# Patient Record
Sex: FEMALE | Race: WHITE | NOT HISPANIC OR LATINO | Employment: FULL TIME | URBAN - METROPOLITAN AREA
[De-identification: names, ages, dates, MRNs, and addresses within clinical notes are randomized per-mention and may not be internally consistent; named-entity substitution may affect disease eponyms.]

---

## 2021-01-29 LAB
EXTERNAL HEPATITIS B SURFACE ANTIGEN: NEGATIVE
EXTERNAL HEPATITIS C AB: NEGATIVE
EXTERNAL RUBELLA QUALITATIVE: NORMAL
EXTERNAL VDRL: NORMAL
HIV1 P24 AG SERPL QL IA: NEGATIVE

## 2021-06-01 ENCOUNTER — HOSPITAL ENCOUNTER (OUTPATIENT)
Dept: DIABETES SERVICES | Facility: HOSPITAL | Age: 31
Discharge: HOME | End: 2021-06-01
Attending: OBSTETRICS & GYNECOLOGY

## 2021-07-03 ENCOUNTER — HOSPITAL ENCOUNTER (OUTPATIENT)
Facility: HOSPITAL | Age: 31
Discharge: HOME | End: 2021-07-03
Attending: OBSTETRICS & GYNECOLOGY | Admitting: OBSTETRICS & GYNECOLOGY

## 2021-07-03 VITALS
HEIGHT: 59 IN | RESPIRATION RATE: 20 BRPM | TEMPERATURE: 98.5 F | DIASTOLIC BLOOD PRESSURE: 48 MMHG | WEIGHT: 215 LBS | BODY MASS INDEX: 43.34 KG/M2 | HEART RATE: 83 BPM | SYSTOLIC BLOOD PRESSURE: 111 MMHG | OXYGEN SATURATION: 99 %

## 2021-07-03 PROCEDURE — 59025 FETAL NON-STRESS TEST: CPT

## 2021-07-03 PROCEDURE — G0463 HOSPITAL OUTPT CLINIC VISIT: HCPCS

## 2021-07-03 RX ORDER — PRENATAL VIT NO.126/IRON/FOLIC 28MG-0.8MG
TABLET ORAL DAILY
COMMUNITY
End: 2022-04-05

## 2021-07-03 RX ORDER — MELATONIN
2000 DAILY
COMMUNITY
End: 2022-04-05

## 2021-07-03 NOTE — NON STRESS TEST
Obstetrical Non-stress Test Interpretation     Name:  Freda Curry  MRN: [id number]    30 y.o. female  at 33w4d    Indication: Abdominal pain  Weeks Gestation: 33w4d       Baseline: 140 BPM  Variability:   Moderate/Normal (amplitude 6-25 bpm)  Accelerations: Present (32 weeks+) 15 x 15 bpm  Decelerations: Absent   Contractions:  Absent      Impression:  Reactive NST    Plan: Discharge to home.  Keep follow up per office instructions.      Blaise Doty Sr., MD  7/3/2021  11:05 EDT

## 2021-07-03 NOTE — DISCHARGE INSTR - LAB
Follow up as scheduled. Return to Labor and Delivery for any concerns or new and worsening symptoms.

## 2021-07-29 ENCOUNTER — HOSPITAL ENCOUNTER (OUTPATIENT)
Facility: HOSPITAL | Age: 31
Discharge: HOME | End: 2021-07-29
Attending: OBSTETRICS & GYNECOLOGY | Admitting: OBSTETRICS & GYNECOLOGY

## 2021-07-29 VITALS — SYSTOLIC BLOOD PRESSURE: 91 MMHG | DIASTOLIC BLOOD PRESSURE: 44 MMHG | RESPIRATION RATE: 18 BRPM | HEART RATE: 72 BPM

## 2021-07-29 PROCEDURE — G0463 HOSPITAL OUTPT CLINIC VISIT: HCPCS

## 2021-07-29 PROCEDURE — 59025 FETAL NON-STRESS TEST: CPT

## 2021-08-05 ENCOUNTER — HOSPITAL ENCOUNTER (OUTPATIENT)
Facility: HOSPITAL | Age: 31
Discharge: HOME | End: 2021-08-05
Attending: OBSTETRICS & GYNECOLOGY | Admitting: ADVANCED PRACTICE MIDWIFE

## 2021-08-05 ENCOUNTER — HOSPITAL ENCOUNTER (OUTPATIENT)
Dept: LABOR AND DELIVERY | Facility: HOSPITAL | Age: 31
Setting detail: SURGERY ADMIT
Discharge: HOME | End: 2021-08-05

## 2021-08-05 VITALS — SYSTOLIC BLOOD PRESSURE: 117 MMHG | RESPIRATION RATE: 18 BRPM | HEART RATE: 75 BPM | DIASTOLIC BLOOD PRESSURE: 57 MMHG

## 2021-08-05 PROCEDURE — G0463 HOSPITAL OUTPT CLINIC VISIT: HCPCS

## 2021-08-05 PROCEDURE — 59025 FETAL NON-STRESS TEST: CPT

## 2021-08-05 NOTE — NON STRESS TEST
[facility]   Obstetrical Non-Stress Test Interpretation    Patient Name: Freda Curry  : 1990  MRN: [id number]  Primary Care Physician:  Daniel Ram MD  Date of admission: 2021      30 y.o. female  at 38w2d    Indication: Elevated BMI      Baseline: Normal 110-160 bpm  Variability:   Moderate/Normal (amplitude 6-25 bpm)  Accelerations: Present (32 weeks+) 15 x 15 bpm  Decelerations: Absent   Contractions:  Present       Impression:  Reactive      Dayan Griffiths CNM  2021  15:18 EDT

## 2021-08-05 NOTE — SIGNIFICANT NOTE
08/05/21 1142   Nonstress Test   Reason for NST Other (Comment)  (ELEVATED BMI)   Acoustic Stimulator No   Uterine Irritability No   Contractions Irregular  (OCCASIONAL CONTRACTION  PT UNAWARE)   Fetal Assessment   Fetal Movement active   Fetal HR Assessment Method external   Fetal HR (beats/min) 140  (FHR TRACING REVIEWED BY HANNAH HAY CNM AND DISCHARGE ORDERS GIVEN)   Fetal HR Variability moderate (amplitude range 6 to 25 bpm)   Fetal HR Accelerations lasting at least 15 seconds   Fetal HR Decelerations absent   Interpretation A   Nonstress Test Interpretation A Reactive

## 2021-08-10 ENCOUNTER — PREP FOR SURGERY (OUTPATIENT)
Dept: OTHER | Facility: HOSPITAL | Age: 31
End: 2021-08-10

## 2021-08-10 PROBLEM — Z98.891 H/O: C-SECTION: Status: ACTIVE | Noted: 2021-08-10

## 2021-08-10 RX ORDER — ONDANSETRON 4 MG/1
4 TABLET, FILM COATED ORAL EVERY 6 HOURS PRN
Status: CANCELLED | OUTPATIENT
Start: 2021-08-10

## 2021-08-10 RX ORDER — ONDANSETRON 2 MG/ML
4 INJECTION INTRAMUSCULAR; INTRAVENOUS EVERY 6 HOURS PRN
Status: CANCELLED | OUTPATIENT
Start: 2021-08-10

## 2021-08-10 RX ORDER — MISOPROSTOL 200 UG/1
800 TABLET ORAL AS NEEDED
Status: CANCELLED | OUTPATIENT
Start: 2021-08-10

## 2021-08-10 RX ORDER — TRISODIUM CITRATE DIHYDRATE AND CITRIC ACID MONOHYDRATE 500; 334 MG/5ML; MG/5ML
30 SOLUTION ORAL ONCE
Status: CANCELLED | OUTPATIENT
Start: 2021-08-10 | End: 2021-08-10

## 2021-08-10 RX ORDER — FAMOTIDINE 20 MG/1
20 TABLET, FILM COATED ORAL ONCE AS NEEDED
Status: CANCELLED | OUTPATIENT
Start: 2021-08-10

## 2021-08-10 RX ORDER — SODIUM CHLORIDE 0.9 % (FLUSH) 0.9 %
10 SYRINGE (ML) INJECTION AS NEEDED
Status: CANCELLED | OUTPATIENT
Start: 2021-08-10

## 2021-08-10 RX ORDER — LIDOCAINE HYDROCHLORIDE 10 MG/ML
5 INJECTION, SOLUTION EPIDURAL; INFILTRATION; INTRACAUDAL; PERINEURAL AS NEEDED
Status: CANCELLED | OUTPATIENT
Start: 2021-08-10

## 2021-08-10 RX ORDER — METOCLOPRAMIDE HYDROCHLORIDE 5 MG/ML
10 INJECTION INTRAMUSCULAR; INTRAVENOUS ONCE AS NEEDED
Status: CANCELLED | OUTPATIENT
Start: 2021-08-10

## 2021-08-10 RX ORDER — OXYTOCIN-SODIUM CHLORIDE 0.9% IV SOLN 30 UNIT/500ML 30-0.9/5 UT/ML-%
125 SOLUTION INTRAVENOUS ONCE
Status: CANCELLED | OUTPATIENT
Start: 2021-08-10 | End: 2021-08-10

## 2021-08-10 RX ORDER — SODIUM CHLORIDE 0.9 % (FLUSH) 0.9 %
3 SYRINGE (ML) INJECTION EVERY 12 HOURS SCHEDULED
Status: CANCELLED | OUTPATIENT
Start: 2021-08-10

## 2021-08-10 RX ORDER — METHYLERGONOVINE MALEATE 0.2 MG/ML
200 INJECTION INTRAVENOUS ONCE AS NEEDED
Status: CANCELLED | OUTPATIENT
Start: 2021-08-10

## 2021-08-10 RX ORDER — FAMOTIDINE 10 MG/ML
20 INJECTION, SOLUTION INTRAVENOUS ONCE AS NEEDED
Status: CANCELLED | OUTPATIENT
Start: 2021-08-10

## 2021-08-10 RX ORDER — CEFAZOLIN SODIUM IN 0.9 % NACL 3 G/100 ML
3 INTRAVENOUS SOLUTION, PIGGYBACK (ML) INTRAVENOUS ONCE
Status: CANCELLED | OUTPATIENT
Start: 2021-08-10 | End: 2021-08-10

## 2021-08-10 RX ORDER — SODIUM CHLORIDE, SODIUM LACTATE, POTASSIUM CHLORIDE, CALCIUM CHLORIDE 600; 310; 30; 20 MG/100ML; MG/100ML; MG/100ML; MG/100ML
150 INJECTION, SOLUTION INTRAVENOUS CONTINUOUS
Status: CANCELLED | OUTPATIENT
Start: 2021-08-10

## 2021-08-10 RX ORDER — TRANEXAMIC ACID 100 MG/ML
1000 INJECTION, SOLUTION INTRAVENOUS ONCE AS NEEDED
Status: CANCELLED | OUTPATIENT
Start: 2021-08-10

## 2021-08-10 NOTE — H&P (VIEW-ONLY)
OB HISTORY AND PHYSICAL      SUBJECTIVE:    30 y.o. female  currently at 39w1d PN complicated by:  Elevated BMI, Prior  and GDMA2    CC:  Presents with Scheduled CS    HPI:  Just recently started on metformin    ROS: No leaking fluid, No vaginal bleeding, No contractions and Adequate FM    Past OB History: G1 2016 Csection CPD 7 1/2lbs    Prenatal Labs:  Reviewed, see PNR, labs of note: Apos, RI, abn 3hr GTT, GBS neg    PMHx:    Past Medical History:   Diagnosis Date   • Asthma    • Gestational diabetes        Home Medications:  cholecalciferol, metFORMIN, and prenatal vitamin    Allergies:  Allergies   Allergen Reactions   • Penicillins Rash       PSHx:    Past Surgical History:   Procedure Laterality Date   •  SECTION  2016   • CHOLECYSTECTOMY     • PARATHYROID GLAND SURGERY  2012    Parathyroid tumor removal       Social History:    reports that she has never smoked. She has never used smokeless tobacco. She reports previous alcohol use. She reports that she does not use drugs.    Family History: Non contributory    Immunizations: See prenatal record for Tdap, Flu, Covid and/or other vaccinations    PHYSICAL EXAM:    Vitals: Reviewed       General- NAD, alert and oriented, appropriate  Psych- normal mood, good memory  CV- Regular rhythm, no murnurs  Resp- CTA to bases, no wheezes  Abdomen- Gravid, non tender  Fundus-  Non tender. Size: larger than dates, pannus  Presentation- Variable  Ext/DTR: No edema    Fetal HR: Doptones 150  Contractions: Not henry    Lab/Imaging/Other: see EPIC, reviewed if available      ASSESSMENT:  39w1d  Scheduled CS  BMI 44  GDMA2- metformin  GBS: Negative    PLAN:  Admit  Delivery:    Accucheck BS  Allergy to PCN is mild rash, Kefzol 3gm at CS  Plan of care [facility] hospital course, R/B/A/potential SE, length have been reviewed with patient, questions answered to her/his/their satisfaction.  Pt desires to proceed as above.  Counseling:The  patient was counseled on the risks, benefits and alternatives of a .  Risks reviewed, but are not limited to: anesthesia, bleeding, transfusion, infection, damage to organs, reoperation, wound separation, blood clots, and death.  She declines the alternatives and desires a .  All her questions have been answered to her satisfaction and she desires to proceed.        Electronically signed by Lori Sandoval DO, 08/10/21, 6:38 PM EDT.

## 2021-08-10 NOTE — H&P
OB HISTORY AND PHYSICAL      SUBJECTIVE:    30 y.o. female  currently at 39w1d PN complicated by:  Elevated BMI, Prior  and GDMA2    CC:  Presents with Scheduled CS    HPI:  Just recently started on metformin    ROS: No leaking fluid, No vaginal bleeding, No contractions and Adequate FM    Past OB History: G1 2016 Csection CPD 7 1/2lbs    Prenatal Labs:  Reviewed, see PNR, labs of note: Apos, RI, abn 3hr GTT, GBS neg    PMHx:    Past Medical History:   Diagnosis Date   • Asthma    • Gestational diabetes        Home Medications:  cholecalciferol, metFORMIN, and prenatal vitamin    Allergies:  Allergies   Allergen Reactions   • Penicillins Rash       PSHx:    Past Surgical History:   Procedure Laterality Date   •  SECTION  2016   • CHOLECYSTECTOMY     • PARATHYROID GLAND SURGERY  2012    Parathyroid tumor removal       Social History:    reports that she has never smoked. She has never used smokeless tobacco. She reports previous alcohol use. She reports that she does not use drugs.    Family History: Non contributory    Immunizations: See prenatal record for Tdap, Flu, Covid and/or other vaccinations    PHYSICAL EXAM:    Vitals: Reviewed       General- NAD, alert and oriented, appropriate  Psych- normal mood, good memory  CV- Regular rhythm, no murnurs  Resp- CTA to bases, no wheezes  Abdomen- Gravid, non tender  Fundus-  Non tender. Size: larger than dates, pannus  Presentation- Variable  Ext/DTR: No edema    Fetal HR: Doptones 150  Contractions: Not henry    Lab/Imaging/Other: see EPIC, reviewed if available      ASSESSMENT:  39w1d  Scheduled CS  BMI 44  GDMA2- metformin  GBS: Negative    PLAN:  Admit  Delivery:    Accucheck BS  Allergy to PCN is mild rash, Kefzol 3gm at CS  Plan of care [facility] hospital course, R/B/A/potential SE, length have been reviewed with patient, questions answered to her/his/their satisfaction.  Pt desires to proceed as above.  Counseling:The  patient was counseled on the risks, benefits and alternatives of a .  Risks reviewed, but are not limited to: anesthesia, bleeding, transfusion, infection, damage to organs, reoperation, wound separation, blood clots, and death.  She declines the alternatives and desires a .  All her questions have been answered to her satisfaction and she desires to proceed.        Electronically signed by Lori Sandoval DO, 08/10/21, 6:38 PM EDT.

## 2021-08-11 ENCOUNTER — ANESTHESIA (OUTPATIENT)
Dept: LABOR AND DELIVERY | Facility: HOSPITAL | Age: 31
End: 2021-08-11

## 2021-08-11 ENCOUNTER — HOSPITAL ENCOUNTER (INPATIENT)
Facility: HOSPITAL | Age: 31
LOS: 2 days | Discharge: HOME | End: 2021-08-13
Attending: OBSTETRICS & GYNECOLOGY | Admitting: OBSTETRICS & GYNECOLOGY

## 2021-08-11 ENCOUNTER — ANESTHESIA EVENT (OUTPATIENT)
Dept: LABOR AND DELIVERY | Facility: HOSPITAL | Age: 31
End: 2021-08-11

## 2021-08-11 LAB
ABO GROUP BLD: NORMAL
ABO GROUP BLD: NORMAL
BLD GP AB SCN SERPL QL: NEGATIVE
GLUCOSE BLDC GLUCOMTR-MCNC: 74 MG/DL (ref 70–99)
GLUCOSE BLDC GLUCOMTR-MCNC: 78 MG/DL (ref 70–130)
GLUCOSE BLDC GLUCOMTR-MCNC: 79 MG/DL (ref 70–99)
RH BLD: POSITIVE
RH BLD: POSITIVE
T&S EXPIRATION DATE: NORMAL

## 2021-08-11 PROCEDURE — 25010000002 DEXAMETHASONE PER 1 MG: Performed by: NURSE ANESTHETIST, CERTIFIED REGISTERED

## 2021-08-11 PROCEDURE — 25010000003 MORPHINE PER 10 MG: Performed by: ANESTHESIOLOGY

## 2021-08-11 PROCEDURE — 86901 BLOOD TYPING SEROLOGIC RH(D): CPT

## 2021-08-11 PROCEDURE — 25010000002 ONDANSETRON PER 1 MG: Performed by: OBSTETRICS & GYNECOLOGY

## 2021-08-11 PROCEDURE — 25010000002 KETOROLAC TROMETHAMINE PER 15 MG: Performed by: NURSE ANESTHETIST, CERTIFIED REGISTERED

## 2021-08-11 PROCEDURE — 86900 BLOOD TYPING SEROLOGIC ABO: CPT | Performed by: OBSTETRICS & GYNECOLOGY

## 2021-08-11 PROCEDURE — 25010000002 ONDANSETRON PER 1 MG: Performed by: NURSE ANESTHETIST, CERTIFIED REGISTERED

## 2021-08-11 PROCEDURE — 82962 GLUCOSE BLOOD TEST: CPT

## 2021-08-11 PROCEDURE — 86850 RBC ANTIBODY SCREEN: CPT | Performed by: OBSTETRICS & GYNECOLOGY

## 2021-08-11 PROCEDURE — 25010000002 KETOROLAC TROMETHAMINE PER 15 MG: Performed by: OBSTETRICS & GYNECOLOGY

## 2021-08-11 PROCEDURE — 86901 BLOOD TYPING SEROLOGIC RH(D): CPT | Performed by: OBSTETRICS & GYNECOLOGY

## 2021-08-11 PROCEDURE — 86900 BLOOD TYPING SEROLOGIC ABO: CPT

## 2021-08-11 RX ORDER — OXYCODONE HYDROCHLORIDE 5 MG/1
5 TABLET ORAL
Status: DISCONTINUED | OUTPATIENT
Start: 2021-08-11 | End: 2021-08-13 | Stop reason: HOSPADM

## 2021-08-11 RX ORDER — ONDANSETRON 2 MG/ML
4 INJECTION INTRAMUSCULAR; INTRAVENOUS EVERY 6 HOURS PRN
Status: DISCONTINUED | OUTPATIENT
Start: 2021-08-11 | End: 2021-08-13 | Stop reason: HOSPADM

## 2021-08-11 RX ORDER — ONDANSETRON 2 MG/ML
INJECTION INTRAMUSCULAR; INTRAVENOUS AS NEEDED
Status: DISCONTINUED | OUTPATIENT
Start: 2021-08-11 | End: 2021-08-11 | Stop reason: SURG

## 2021-08-11 RX ORDER — IBUPROFEN 800 MG/1
800 TABLET ORAL EVERY 8 HOURS SCHEDULED
Status: DISCONTINUED | OUTPATIENT
Start: 2021-08-12 | End: 2021-08-13 | Stop reason: HOSPADM

## 2021-08-11 RX ORDER — HYDROCODONE BITARTRATE AND ACETAMINOPHEN 5; 325 MG/1; MG/1
1 TABLET ORAL EVERY 4 HOURS PRN
Status: DISCONTINUED | OUTPATIENT
Start: 2021-08-12 | End: 2021-08-13 | Stop reason: HOSPADM

## 2021-08-11 RX ORDER — TRANEXAMIC ACID 100 MG/ML
1000 INJECTION, SOLUTION INTRAVENOUS ONCE AS NEEDED
Status: DISCONTINUED | OUTPATIENT
Start: 2021-08-11 | End: 2021-08-13 | Stop reason: HOSPADM

## 2021-08-11 RX ORDER — CARBOPROST TROMETHAMINE 250 UG/ML
250 INJECTION, SOLUTION INTRAMUSCULAR AS NEEDED
Status: DISCONTINUED | OUTPATIENT
Start: 2021-08-11 | End: 2021-08-13 | Stop reason: HOSPADM

## 2021-08-11 RX ORDER — KETOROLAC TROMETHAMINE 30 MG/ML
INJECTION, SOLUTION INTRAMUSCULAR; INTRAVENOUS AS NEEDED
Status: DISCONTINUED | OUTPATIENT
Start: 2021-08-11 | End: 2021-08-11 | Stop reason: SURG

## 2021-08-11 RX ORDER — EPHEDRINE SULFATE 50 MG/ML
INJECTION, SOLUTION INTRAVENOUS AS NEEDED
Status: DISCONTINUED | OUTPATIENT
Start: 2021-08-11 | End: 2021-08-11 | Stop reason: SURG

## 2021-08-11 RX ORDER — FAMOTIDINE 20 MG/1
20 TABLET, FILM COATED ORAL ONCE AS NEEDED
Status: DISCONTINUED | OUTPATIENT
Start: 2021-08-11 | End: 2021-08-13 | Stop reason: HOSPADM

## 2021-08-11 RX ORDER — HYDROCORTISONE 25 MG/G
CREAM TOPICAL 3 TIMES DAILY PRN
Status: DISCONTINUED | OUTPATIENT
Start: 2021-08-11 | End: 2021-08-13 | Stop reason: HOSPADM

## 2021-08-11 RX ORDER — ACETAMINOPHEN 325 MG/1
650 TABLET ORAL EVERY 6 HOURS
Status: DISCONTINUED | OUTPATIENT
Start: 2021-08-11 | End: 2021-08-13 | Stop reason: HOSPADM

## 2021-08-11 RX ORDER — SODIUM CHLORIDE 0.9 % (FLUSH) 0.9 %
3 SYRINGE (ML) INJECTION EVERY 12 HOURS SCHEDULED
Status: DISCONTINUED | OUTPATIENT
Start: 2021-08-11 | End: 2021-08-11 | Stop reason: HOSPADM

## 2021-08-11 RX ORDER — MISOPROSTOL 200 UG/1
800 TABLET ORAL AS NEEDED
Status: DISCONTINUED | OUTPATIENT
Start: 2021-08-11 | End: 2021-08-11 | Stop reason: HOSPADM

## 2021-08-11 RX ORDER — KETOROLAC TROMETHAMINE 30 MG/ML
30 INJECTION, SOLUTION INTRAMUSCULAR; INTRAVENOUS EVERY 6 HOURS
Status: DISCONTINUED | OUTPATIENT
Start: 2021-08-11 | End: 2021-08-11

## 2021-08-11 RX ORDER — SODIUM CHLORIDE, SODIUM LACTATE, POTASSIUM CHLORIDE, CALCIUM CHLORIDE 600; 310; 30; 20 MG/100ML; MG/100ML; MG/100ML; MG/100ML
150 INJECTION, SOLUTION INTRAVENOUS CONTINUOUS
Status: DISCONTINUED | OUTPATIENT
Start: 2021-08-11 | End: 2021-08-11

## 2021-08-11 RX ORDER — DIPHENHYDRAMINE HYDROCHLORIDE 50 MG/ML
12.5 INJECTION INTRAMUSCULAR; INTRAVENOUS EVERY 6 HOURS PRN
Status: ACTIVE | OUTPATIENT
Start: 2021-08-11 | End: 2021-08-12

## 2021-08-11 RX ORDER — PHENYLEPHRINE HCL IN 0.9% NACL 1 MG/10 ML
SYRINGE (ML) INTRAVENOUS AS NEEDED
Status: DISCONTINUED | OUTPATIENT
Start: 2021-08-11 | End: 2021-08-11 | Stop reason: SURG

## 2021-08-11 RX ORDER — KETOROLAC TROMETHAMINE 30 MG/ML
30 INJECTION, SOLUTION INTRAMUSCULAR; INTRAVENOUS EVERY 6 HOURS
Status: DISPENSED | OUTPATIENT
Start: 2021-08-11 | End: 2021-08-12

## 2021-08-11 RX ORDER — BUTORPHANOL TARTRATE 1 MG/ML
2 INJECTION, SOLUTION INTRAMUSCULAR; INTRAVENOUS EVERY 6 HOURS PRN
Status: ACTIVE | OUTPATIENT
Start: 2021-08-11 | End: 2021-08-12

## 2021-08-11 RX ORDER — OXYTOCIN-SODIUM CHLORIDE 0.9% IV SOLN 30 UNIT/500ML 30-0.9/5 UT/ML-%
125 SOLUTION INTRAVENOUS ONCE
Status: DISCONTINUED | OUTPATIENT
Start: 2021-08-11 | End: 2021-08-11

## 2021-08-11 RX ORDER — METOCLOPRAMIDE HYDROCHLORIDE 5 MG/ML
10 INJECTION INTRAMUSCULAR; INTRAVENOUS ONCE AS NEEDED
Status: DISCONTINUED | OUTPATIENT
Start: 2021-08-11 | End: 2021-08-11 | Stop reason: HOSPADM

## 2021-08-11 RX ORDER — MORPHINE SULFATE 0.5 MG/ML
INJECTION, SOLUTION EPIDURAL; INTRATHECAL; INTRAVENOUS
Status: COMPLETED | OUTPATIENT
Start: 2021-08-11 | End: 2021-08-11

## 2021-08-11 RX ORDER — SIMETHICONE 80 MG
80 TABLET,CHEWABLE ORAL 4 TIMES DAILY PRN
Status: DISCONTINUED | OUTPATIENT
Start: 2021-08-11 | End: 2021-08-13 | Stop reason: HOSPADM

## 2021-08-11 RX ORDER — ONDANSETRON 4 MG/1
4 TABLET, FILM COATED ORAL EVERY 6 HOURS PRN
Status: DISCONTINUED | OUTPATIENT
Start: 2021-08-11 | End: 2021-08-13 | Stop reason: HOSPADM

## 2021-08-11 RX ORDER — HYDROMORPHONE HCL 110MG/55ML
0.25 PATIENT CONTROLLED ANALGESIA SYRINGE INTRAVENOUS
Status: DISCONTINUED | OUTPATIENT
Start: 2021-08-11 | End: 2021-08-13 | Stop reason: HOSPADM

## 2021-08-11 RX ORDER — SODIUM CHLORIDE, SODIUM LACTATE, POTASSIUM CHLORIDE, CALCIUM CHLORIDE 600; 310; 30; 20 MG/100ML; MG/100ML; MG/100ML; MG/100ML
150 INJECTION, SOLUTION INTRAVENOUS CONTINUOUS
Status: DISCONTINUED | OUTPATIENT
Start: 2021-08-11 | End: 2021-08-13 | Stop reason: HOSPADM

## 2021-08-11 RX ORDER — METHYLERGONOVINE MALEATE 0.2 MG/ML
200 INJECTION INTRAVENOUS ONCE AS NEEDED
Status: DISCONTINUED | OUTPATIENT
Start: 2021-08-11 | End: 2021-08-11 | Stop reason: HOSPADM

## 2021-08-11 RX ORDER — HYDROMORPHONE HCL 110MG/55ML
0.5 PATIENT CONTROLLED ANALGESIA SYRINGE INTRAVENOUS
Status: DISCONTINUED | OUTPATIENT
Start: 2021-08-11 | End: 2021-08-13 | Stop reason: HOSPADM

## 2021-08-11 RX ORDER — SODIUM CHLORIDE 0.9 % (FLUSH) 0.9 %
10 SYRINGE (ML) INJECTION AS NEEDED
Status: DISCONTINUED | OUTPATIENT
Start: 2021-08-11 | End: 2021-08-11 | Stop reason: HOSPADM

## 2021-08-11 RX ORDER — OXYTOCIN 10 [USP'U]/ML
INJECTION, SOLUTION INTRAMUSCULAR; INTRAVENOUS AS NEEDED
Status: DISCONTINUED | OUTPATIENT
Start: 2021-08-11 | End: 2021-08-11 | Stop reason: SURG

## 2021-08-11 RX ORDER — NALOXONE HCL 0.4 MG/ML
0.4 VIAL (ML) INJECTION ONCE AS NEEDED
Status: ACTIVE | OUTPATIENT
Start: 2021-08-11 | End: 2021-08-12

## 2021-08-11 RX ORDER — DOCUSATE SODIUM 100 MG/1
100 CAPSULE, LIQUID FILLED ORAL DAILY
Status: DISCONTINUED | OUTPATIENT
Start: 2021-08-11 | End: 2021-08-13 | Stop reason: HOSPADM

## 2021-08-11 RX ORDER — DIPHENHYDRAMINE HCL 25 MG
25 CAPSULE ORAL EVERY 6 HOURS PRN
Status: ACTIVE | OUTPATIENT
Start: 2021-08-11 | End: 2021-08-12

## 2021-08-11 RX ORDER — METHYLERGONOVINE MALEATE 0.2 MG/ML
200 INJECTION INTRAVENOUS ONCE AS NEEDED
Status: DISCONTINUED | OUTPATIENT
Start: 2021-08-11 | End: 2021-08-13 | Stop reason: HOSPADM

## 2021-08-11 RX ORDER — CEFAZOLIN SODIUM IN 0.9 % NACL 3 G/100 ML
3 INTRAVENOUS SOLUTION, PIGGYBACK (ML) INTRAVENOUS ONCE
Status: COMPLETED | OUTPATIENT
Start: 2021-08-11 | End: 2021-08-11

## 2021-08-11 RX ORDER — HYDROCODONE BITARTRATE AND ACETAMINOPHEN 10; 325 MG/1; MG/1
1 TABLET ORAL EVERY 4 HOURS PRN
Status: DISCONTINUED | OUTPATIENT
Start: 2021-08-12 | End: 2021-08-13 | Stop reason: HOSPADM

## 2021-08-11 RX ORDER — BUPIVACAINE HYDROCHLORIDE 7.5 MG/ML
INJECTION, SOLUTION EPIDURAL; RETROBULBAR
Status: COMPLETED | OUTPATIENT
Start: 2021-08-11 | End: 2021-08-11

## 2021-08-11 RX ORDER — MEPERIDINE HYDROCHLORIDE 25 MG/ML
12.5 INJECTION INTRAMUSCULAR; INTRAVENOUS; SUBCUTANEOUS
Status: ACTIVE | OUTPATIENT
Start: 2021-08-11 | End: 2021-08-12

## 2021-08-11 RX ORDER — LIDOCAINE HYDROCHLORIDE 10 MG/ML
5 INJECTION, SOLUTION EPIDURAL; INFILTRATION; INTRACAUDAL; PERINEURAL AS NEEDED
Status: DISCONTINUED | OUTPATIENT
Start: 2021-08-11 | End: 2021-08-11 | Stop reason: HOSPADM

## 2021-08-11 RX ORDER — HYDROCODONE BITARTRATE AND ACETAMINOPHEN 5; 325 MG/1; MG/1
1 TABLET ORAL EVERY 6 HOURS PRN
Status: ACTIVE | OUTPATIENT
Start: 2021-08-11 | End: 2021-08-12

## 2021-08-11 RX ORDER — MISOPROSTOL 200 UG/1
200 TABLET ORAL AS NEEDED
Status: DISCONTINUED | OUTPATIENT
Start: 2021-08-11 | End: 2021-08-13 | Stop reason: HOSPADM

## 2021-08-11 RX ORDER — TRISODIUM CITRATE DIHYDRATE AND CITRIC ACID MONOHYDRATE 500; 334 MG/5ML; MG/5ML
30 SOLUTION ORAL ONCE
Status: DISCONTINUED | OUTPATIENT
Start: 2021-08-11 | End: 2021-08-11 | Stop reason: HOSPADM

## 2021-08-11 RX ORDER — FAMOTIDINE 10 MG/ML
20 INJECTION, SOLUTION INTRAVENOUS ONCE AS NEEDED
Status: DISCONTINUED | OUTPATIENT
Start: 2021-08-11 | End: 2021-08-13 | Stop reason: HOSPADM

## 2021-08-11 RX ORDER — DEXAMETHASONE SODIUM PHOSPHATE 4 MG/ML
INJECTION, SOLUTION INTRA-ARTICULAR; INTRALESIONAL; INTRAMUSCULAR; INTRAVENOUS; SOFT TISSUE AS NEEDED
Status: DISCONTINUED | OUTPATIENT
Start: 2021-08-11 | End: 2021-08-11 | Stop reason: SURG

## 2021-08-11 RX ORDER — FAMOTIDINE 10 MG/ML
20 INJECTION, SOLUTION INTRAVENOUS ONCE AS NEEDED
Status: DISCONTINUED | OUTPATIENT
Start: 2021-08-11 | End: 2021-08-11 | Stop reason: HOSPADM

## 2021-08-11 RX ADMIN — SODIUM CHLORIDE, POTASSIUM CHLORIDE, SODIUM LACTATE AND CALCIUM CHLORIDE: 600; 310; 30; 20 INJECTION, SOLUTION INTRAVENOUS at 13:56

## 2021-08-11 RX ADMIN — SODIUM CHLORIDE, POTASSIUM CHLORIDE, SODIUM LACTATE AND CALCIUM CHLORIDE 150 ML/HR: 600; 310; 30; 20 INJECTION, SOLUTION INTRAVENOUS at 11:17

## 2021-08-11 RX ADMIN — ONDANSETRON 4 MG: 2 INJECTION INTRAMUSCULAR; INTRAVENOUS at 14:21

## 2021-08-11 RX ADMIN — KETOROLAC TROMETHAMINE 30 MG: 30 INJECTION, SOLUTION INTRAMUSCULAR; INTRAVENOUS at 20:02

## 2021-08-11 RX ADMIN — Medication 100 MCG: at 13:40

## 2021-08-11 RX ADMIN — MORPHINE SULFATE 0.25 MG: 0.5 INJECTION, SOLUTION EPIDURAL; INTRATHECAL; INTRAVENOUS at 13:24

## 2021-08-11 RX ADMIN — CEFAZOLIN 3 G: 1 INJECTION, POWDER, FOR SOLUTION INTRAMUSCULAR; INTRAVENOUS; PARENTERAL at 13:16

## 2021-08-11 RX ADMIN — Medication 100 MCG: at 13:31

## 2021-08-11 RX ADMIN — BUPIVACAINE HYDROCHLORIDE 1.4 ML: 7.5 INJECTION, SOLUTION EPIDURAL; RETROBULBAR at 13:24

## 2021-08-11 RX ADMIN — Medication 100 MCG: at 13:28

## 2021-08-11 RX ADMIN — EPHEDRINE SULFATE 5 MG: 50 INJECTION INTRAVENOUS at 13:28

## 2021-08-11 RX ADMIN — EPHEDRINE SULFATE 5 MG: 50 INJECTION INTRAVENOUS at 13:34

## 2021-08-11 RX ADMIN — KETOROLAC TROMETHAMINE 30 MG: 30 INJECTION, SOLUTION INTRAMUSCULAR; INTRAVENOUS at 14:21

## 2021-08-11 RX ADMIN — ONDANSETRON 4 MG: 2 INJECTION INTRAMUSCULAR; INTRAVENOUS at 20:02

## 2021-08-11 RX ADMIN — DOCUSATE SODIUM 100 MG: 100 CAPSULE, LIQUID FILLED ORAL at 20:02

## 2021-08-11 RX ADMIN — OXYTOCIN 40 UNITS: 10 INJECTION, SOLUTION INTRAMUSCULAR; INTRAVENOUS at 13:56

## 2021-08-11 RX ADMIN — EPHEDRINE SULFATE 5 MG: 50 INJECTION INTRAVENOUS at 13:31

## 2021-08-11 RX ADMIN — ACETAMINOPHEN 650 MG: 325 TABLET ORAL at 20:02

## 2021-08-11 RX ADMIN — Medication 100 MCG: at 13:34

## 2021-08-11 RX ADMIN — DEXAMETHASONE SODIUM PHOSPHATE 4 MG: 4 INJECTION INTRA-ARTICULAR; INTRALESIONAL; INTRAMUSCULAR; INTRAVENOUS; SOFT TISSUE at 14:21

## 2021-08-11 RX ADMIN — Medication 100 MCG: at 13:53

## 2021-08-11 NOTE — LACTATION NOTE
LC in to assist with this first feeding. Mom states first baby never latched so she exclusively pumped for 11 months. This infant was showing good eagerness to eat and with latch assistance from LC was able to maintain latch on both sides but did take several attempts. Mom has short/flatter nipples and a thick areola. LC discussed that at some point that infant may become drowsy and less likely to latch without more assistance. Mom and dad showed good understanding.LC discussed with mom about  normal  breastfeeding behaviors and breastfeeding expectations for the next 2 days and to call as needed for lactation assistance . Mom showed good understanding.

## 2021-08-11 NOTE — ANESTHESIA PROCEDURE NOTES
Spinal Block      Start Time: 8/11/2021 1:18 PM  Stop Time: 8/11/2021 1:21 PM  Performed By  CRNA: Darlin Locke CRNA  Spinal Block Prep:  Patient Position:sitting  Sterile Tech:cap, gloves, mask and sterile barriers  Prep:Chloraprep  Spinal Block Procedure  Approach:midline  Location:L3-L4  Needle Type:Pencan  Needle Gauge:24 G  Paresthesia: no  Fluid Appearance:clear  Medications: morphine PF (DURAMORPH) injection, 0.25 mg  bupivacaine PF (MARCAINE) injection 0.75%, 1.4 mL   Post Assessment  Patient Tolerance:patient tolerated the procedure well with no apparent complications  Complications no

## 2021-08-11 NOTE — ANESTHESIA PREPROCEDURE EVALUATION
Anesthesia Evaluation     Patient summary reviewed and Nursing notes reviewed   NPO Solid Status: > 6 hours  NPO Liquid Status: > 6 hours           Airway   Mallampati: II  TM distance: >3 FB  Dental      Pulmonary - normal exam   (+) asthma,  Cardiovascular - normal exam  Exercise tolerance: good (4-7 METS)        Neuro/Psych  GI/Hepatic/Renal/Endo    (+)   diabetes mellitus,     Musculoskeletal     Abdominal    Substance History      OB/GYN          Other                        Anesthesia Plan    ASA 2     regional       Anesthetic plan, all risks, benefits, and alternatives have been provided, discussed and informed consent has been obtained with: patient.

## 2021-08-11 NOTE — DISCHARGE INSTRUCTIONS
DR. CODY'S POSTOP  DISCHARGE PRECAUTIONS and Answers to FAQs     NO SEX, tampons, or douching for [SIX] weeks.     NO DRIVING for [TWO] weeks. or while taking narcotic pain medications.     NO TUB BATH or POOL for FOUR weeks, shower only.       NO LIFTING more than [20] pounds for [2] week(s).     STAPLES (if present):  follow up at the office in the next 3-7 days to have them removed if they were not removed before discharge.  If your staples were removed already and steri-strips placed (or you just had steri-strips) REMOVE YOUR STERI STRIPS in TEN DAYS.      INCISION CARE:   WASH DAILY in the shower with soap and water (any type of soap is fine, it does not need to be antibacterial soap).  Look (or have a family member/friend look) at your incision EVERY DAY when you get home.  Keeping the incision DRY is extremely important.  Continue to keep a clean, dry wash cloth (to help wick away moisture) on your incision for 10 days.  Change out the wash cloth frequently (approximately every 2-8 hrs as needed to prevent it from getting moist).  REDNESS, PUS, increase in PAIN, FEVER or CHILLS are all reasons to be seen in our office immediately.  Go to the ER, if it is after hours or a weekend.         VAGINAL BLEEDING:  may continue on and off over the next several weeks after delivery and may increase slightly once you go home.  You should not be bleeding more than 1 large pad soaked every hour or two.  Clots (even the size of a lemon or larger) may be normal as long as the bleeding is not heavy and the clots do not continue.       FEVER or CHILLS or NOT FEELING WELL: call our office.  If the office is closed, you need to be seen in acute care or ER.       CHEST PAIN or SHORTNESS OF BREATH/AIR: you need to GO TO THE NEAREST ER or CALL 911.      SWELLING:  can increase over the next 7-10 days and then should slowly improve.  Your legs/ankles should be fairly similar in size.  A red, painful, hot, swollen leg  (usually just one side) can be a sign of a blood clot and should be evaluated immediately.  Call our office.  If it is after hours or a weekend, you must be seen IMMEDIATELY IN THE ER.      ELEVATED BLOOD PRESSURE:  you need to contact us if you are having  persistent elevated BP systolic (top number) more than [155] or diastolic (bottom number) more than [95], or a headache (not relieved with rest, hydration or over the counter pain reliever), an increase in your swelling (usually hands and face), changes in your vision (typically flashing white or black spots) or severe persistent pain in the location of the upper right side of your belly (under your right breast).  Call our office or go to ER if after hours or a weekend.     LACTATION QUESTIONS or CONCERNS?  Call [facility] Lactation support [phone].     WORK and SCHOOL TIME OFF: depends on your specific delivery type, surrounding circumstances, and your work insurance/school rules.  If you have questions, please call Wen or Vivi at [phone] (ext. [phone]).  Or email Wen at [email or web address].  They will assist in required paperwork for you and/or family members.      For further QUESTIONS or CONCERNS, please call [facility] at [phone].       [PRESS CHECK TO ACCEPT DEFAULTS]

## 2021-08-11 NOTE — OP NOTE
OB Operative Note        Date of Service:  21     Pre-Operative Dx:   IUP at Gestational Age: 39w1d  weeks    indication: scheduled repeat    Postoperative dx:   MICHELLE    Procedure: Repeat LTCS    Surgeon/Assistant: Dr. Lori Sandoval DO.  First assist Dayan Maxwell.    Anesthesia:  Spinal  Anesthesiologist:   Anesthesiologist: William Bowman MD  CRNA: Darlin Locke CRNA    EBL:  400Mls    Findings:   Normal uterus, Normal tubes, Normal ovaries and Normal placenta, centrally inserting cord    Infant: Gender:  female  infant   Weight:   3020 g (6 lb 10.5 oz)    APGARS:  8  @ 1 minute / 9  @ 5 minutes    Specimens:  Cord blood, cord gases    Procedure Details:  The patient was brought to the operating room and spinal anesthesia was deemed to be adequate.  She was prepped and draped in a normal sterile fashion and placed in supine position with a leftward tilt.  A Traxi panniculus retractor was used.  A Pfannenstiel skin incision was made approximately 2 fingerbreadths above the symphysis pubis and carried down to the underlying layer of fascia.   The fascia was nicked in the midline and extended laterally with Monzon scissors.  The superior and inferior aspects of the fascial incision were grasped with Kocher clamps and the underlying rectus muscle was dissected off bluntly.  The midline was identified and opened in a sharp fashion with no noted damage to underlying bowel, bladder, blood vessels, or organs.  An ivan self retaining retractor was placed.  The vesicouterine peritoneum was incised and the bladder flap was created.  The lower uterine segment was incised in a transverse fashion and extended laterally with bandage scissors.  Fluid was noted to be clear.  The fetal vertex was brought up atraumatically through the uterine incision.  The mouth and nares were bulb suctioned.  The right anterior and left posterior shoulders were delivered easily.  The remainder of the body delivered.  The  infant was vigorous.  The cord was clamped and cut after a delay of 60 seconds and the infant was handed off to [facility] baby care.  A segment of cord was handed off to the technician for collection of cord blood and cord gases.  The placenta delivered with the assistance of fundal massage and was discarded.  The uterus was exteriorized and cleared of all clots and debris.  The uterine incision was repaired with 0 Vicryl in a running fashion.  A second imbricating stitch was placed.  Right sided Terry stitches were placed.  Excellent hemostasis was assured.       The uterus was placed back into the pelvic cavity.  Copious irrigation was performed.  The gutters were cleared of all clot and debris.  The uterine incision was reinspected off tension and noted to be hemostatic.  Interceed was placed over the uterine incision and anterior uterus.  The parietal peritoneum was closed.  The rectus muscles were reapproximated in the midline.  The rectus muscles and fascia were noted to be hemostatic.  The fascia was closed with 0 Vicryl in a running fashion starting at the bilateral angles and to the midline.  The subcutaneous tissue was copiously irrigated and made hemostatic.  It was reapproximated using Monocryl and the skin was closed with staples.  Urine was clear at the end of the procedure.     The patient tolerated the procedure well.  Instrument, lap, and needle counts were correct.  The patient received antibiotics prior to the procedure.  She was taken to the recovery room in stable condition.        Complications:  None    Condition: Good    Disposition:  PACU          Electronically signed by:  Lori Sandoval DO, 08/11/21, 2:41 PM EDT.

## 2021-08-11 NOTE — DISCHARGE SUMMARY
OB Discharge Summary        Admit Date:  2021  Date of Delivery: 2021   Discharge Date: 21    Reason for Admission:  Scheduled RCS    Final Diagnosis:  39+1  scheduled RCS  BMI 44  GDMA1  Breast  To do at FU: 2hr GTT, wt loss    Antepartum:  Prenatal care is complicated by:  Elevated BMI, Prior  and GDMA1    Intrapartum/Delivery:  OB Surgeon:  Lori Sandoval DO  Anesthesia: Spinal  Delivery Type:   Perineum: NA  Feeding method: Breast    Infant: female  infant; Ning    Weight: 3020 g (6 lb 10.5 oz)      APGARS: 8  @ 1 minute / 9  @ 5 minutes    Hospital Course/Significant Findings:  Uncomplicated RCS and postop    Discharge:         Discharge Medications      New Medications      Instructions Start Date   acetaminophen 325 MG tablet  Commonly known as: Tylenol   650 mg, Oral, Every 6 Hours PRN      ibuprofen 800 MG tablet  Commonly known as: ADVIL,MOTRIN   800 mg, Oral, Every 8 Hours PRN         Continue These Medications      Instructions Start Date   cholecalciferol 25 MCG (1000 UT) tablet  Commonly known as: VITAMIN D3   2,000 Units, Oral, Daily      prenatal (CLASSIC) vitamin  tablet  Generic drug: prenatal vitamin   Oral, Daily               Disposition: Home  Diet: Regular    Pelvic Rest: 6 weeks    Condition at discharge: Good    Follow up with: Lori Sandoval DO or provider of her choice    Follow up in: 5 weeks    Complications: None      Signature: Electronically signed by Lori Sandoval DO, 21, 9:00 AM EDT.

## 2021-08-12 LAB
BASOPHILS # BLD AUTO: 0.05 10*3/MM3 (ref 0–0.2)
BASOPHILS NFR BLD AUTO: 0.4 % (ref 0–1.5)
DEPRECATED RDW RBC AUTO: 43.2 FL (ref 37–54)
EOSINOPHIL # BLD AUTO: 0.06 10*3/MM3 (ref 0–0.4)
EOSINOPHIL NFR BLD AUTO: 0.5 % (ref 0.3–6.2)
ERYTHROCYTE [DISTWIDTH] IN BLOOD BY AUTOMATED COUNT: 12.8 % (ref 12.3–15.4)
HCT VFR BLD AUTO: 34.4 % (ref 34–46.6)
HGB BLD-MCNC: 11.6 G/DL (ref 12–15.9)
IMM GRANULOCYTES # BLD AUTO: 0.06 10*3/MM3 (ref 0–0.05)
IMM GRANULOCYTES NFR BLD AUTO: 0.5 % (ref 0–0.5)
LYMPHOCYTES # BLD AUTO: 2.76 10*3/MM3 (ref 0.7–3.1)
LYMPHOCYTES NFR BLD AUTO: 22.1 % (ref 19.6–45.3)
MCH RBC QN AUTO: 31.4 PG (ref 26.6–33)
MCHC RBC AUTO-ENTMCNC: 33.7 G/DL (ref 31.5–35.7)
MCV RBC AUTO: 93 FL (ref 79–97)
MONOCYTES # BLD AUTO: 0.93 10*3/MM3 (ref 0.1–0.9)
MONOCYTES NFR BLD AUTO: 7.4 % (ref 5–12)
NEUTROPHILS NFR BLD AUTO: 69.1 % (ref 42.7–76)
NEUTROPHILS NFR BLD AUTO: 8.64 10*3/MM3 (ref 1.7–7)
NRBC BLD AUTO-RTO: 0 /100 WBC (ref 0–0.2)
PLATELET # BLD AUTO: 147 10*3/MM3 (ref 140–450)
PMV BLD AUTO: 11.2 FL (ref 6–12)
RBC # BLD AUTO: 3.7 10*6/MM3 (ref 3.77–5.28)
WBC # BLD AUTO: 12.5 10*3/MM3 (ref 3.4–10.8)

## 2021-08-12 PROCEDURE — 51702 INSERT TEMP BLADDER CATH: CPT

## 2021-08-12 PROCEDURE — 59025 FETAL NON-STRESS TEST: CPT

## 2021-08-12 PROCEDURE — 85025 COMPLETE CBC W/AUTO DIFF WBC: CPT | Performed by: OBSTETRICS & GYNECOLOGY

## 2021-08-12 PROCEDURE — 25010000002 KETOROLAC TROMETHAMINE PER 15 MG: Performed by: OBSTETRICS & GYNECOLOGY

## 2021-08-12 RX ADMIN — ACETAMINOPHEN 650 MG: 325 TABLET ORAL at 23:50

## 2021-08-12 RX ADMIN — KETOROLAC TROMETHAMINE 30 MG: 30 INJECTION, SOLUTION INTRAMUSCULAR; INTRAVENOUS at 01:45

## 2021-08-12 RX ADMIN — SODIUM CHLORIDE, POTASSIUM CHLORIDE, SODIUM LACTATE AND CALCIUM CHLORIDE 150 ML/HR: 600; 310; 30; 20 INJECTION, SOLUTION INTRAVENOUS at 00:43

## 2021-08-12 RX ADMIN — ACETAMINOPHEN 650 MG: 325 TABLET ORAL at 01:45

## 2021-08-12 RX ADMIN — ACETAMINOPHEN 650 MG: 325 TABLET ORAL at 06:15

## 2021-08-12 RX ADMIN — IBUPROFEN 800 MG: 800 TABLET, FILM COATED ORAL at 23:47

## 2021-08-12 RX ADMIN — IBUPROFEN 800 MG: 800 TABLET, FILM COATED ORAL at 16:44

## 2021-08-12 RX ADMIN — ACETAMINOPHEN 650 MG: 325 TABLET ORAL at 13:28

## 2021-08-12 RX ADMIN — DOCUSATE SODIUM 100 MG: 100 CAPSULE, LIQUID FILLED ORAL at 09:08

## 2021-08-12 RX ADMIN — KETOROLAC TROMETHAMINE 30 MG: 30 INJECTION, SOLUTION INTRAMUSCULAR; INTRAVENOUS at 07:56

## 2021-08-12 RX ADMIN — ACETAMINOPHEN 650 MG: 325 TABLET ORAL at 18:46

## 2021-08-12 NOTE — PLAN OF CARE
Goal Outcome Evaluation:  Plan of Care Reviewed With: patient      Performing self care and infant's care. Breastfeeding well. Vital signs stable

## 2021-08-12 NOTE — PROGRESS NOTES
PostPartum/PostOp PROGRESS NOTE        Subjective:  Patient has no complaints, Pain controlled, Tolerating a regular diet, Passing flatus, Ambulating, Urinating spontaneously, Lochia decreasing, no bleeding concerns, No lightheadedness or dizziness      Objective:     Vitals: reviewed  General- NAD, alert and oriented, appropriate  Psych- Normal mood, good memory  CV/Resp- CV- Regular rhythm, no murnurs and Resp- CTA to bases, no wheezes  Abdomen- Fundus non tender, Soft, non distended, non tender, normal active bowel sounds and Bandage intact  Ext/DTRs- Trace edema, bilaterally equal, no signs of DVT    Lab Results   Component Value Date    WBC 12.50 (H) 08/12/2021    HGB 11.6 (L) 08/12/2021    HCT 34.4 08/12/2021    MCV 93.0 08/12/2021     08/12/2021        Assessment:    Post-partum/postop Day:  1    Plan:     Routine postpartum/postop care    Advance diet, Remove IV, Remove bandage, Shower, PO pain meds, Importance of wound care/keep clean and dry, Breast feeding support, Follow up scheduled             Electronically signed by Lori Sandoval DO, 08/12/21, 9:38 AM EDT.

## 2021-08-12 NOTE — LACTATION NOTE
Lc in to follow up with breastfeeding progress. Mom states that baby became much sleepier after her first feeding and began to need the nipple shield. She is using the 24 mm and it is not causing any pain and her nipples show no trauma. LC encouraged her to call for assistance with a feeding as needed. Mom showed good understanding.

## 2021-08-12 NOTE — PLAN OF CARE
Problem: Adult Inpatient Plan of Care  Goal: Plan of Care Review  Outcome: Ongoing, Progressing  Flowsheets (Taken 8/12/2021 0456)  Progress: improving  Plan of Care Reviewed With: patient  Outcome Summary: Patient progressing towards goals.  Goal: Patient-Specific Goal (Individualized)  Outcome: Ongoing, Progressing  Goal: Absence of Hospital-Acquired Illness or Injury  Outcome: Ongoing, Progressing  Intervention: Identify and Manage Fall Risk  Recent Flowsheet Documentation  Taken 8/12/2021 0400 by Emilia Bearden RN  Safety Promotion/Fall Prevention: safety round/check completed  Taken 8/12/2021 0300 by Emilia Bearden RN  Safety Promotion/Fall Prevention: safety round/check completed  Taken 8/12/2021 0200 by Emilia Bearden RN  Safety Promotion/Fall Prevention: safety round/check completed  Taken 8/12/2021 0130 by Emilia Bearden RN  Safety Promotion/Fall Prevention: safety round/check completed  Taken 8/12/2021 0000 by Emilia Bearden RN  Safety Promotion/Fall Prevention: safety round/check completed  Taken 8/11/2021 2300 by Emilia Bearden RN  Safety Promotion/Fall Prevention: safety round/check completed  Taken 8/11/2021 2200 by Emilia Bearden RN  Safety Promotion/Fall Prevention: safety round/check completed  Taken 8/11/2021 2100 by Emilia Bearden RN  Safety Promotion/Fall Prevention: safety round/check completed  Taken 8/11/2021 2000 by Emilia Bearden RN  Safety Promotion/Fall Prevention: safety round/check completed  Intervention: Prevent Skin Injury  Recent Flowsheet Documentation  Taken 8/11/2021 2000 by Emilia Bearden RN  Body Position: position changed independently  Goal: Optimal Comfort and Wellbeing  Outcome: Ongoing, Progressing  Intervention: Provide Person-Centered Care  Recent Flowsheet Documentation  Taken 8/11/2021 2000 by Emilia Bearden RN  Trust Relationship/Rapport:   care explained   choices provided   emotional support provided   questions answered   questions  encouraged   reassurance provided   thoughts/feelings acknowledged  Goal: Readiness for Transition of Care  Outcome: Ongoing, Progressing     Problem: Bleeding ( Delivery)  Goal: Bleeding is Controlled  Outcome: Ongoing, Progressing  Goal: Bleeding is Controlled  Outcome: Ongoing, Progressing     Problem: Change in Fetal Wellbeing ( Delivery)  Goal: Stable Fetal Wellbeing  Outcome: Ongoing, Progressing  Intervention: Promote and Monitor Fetal Wellbeing  Recent Flowsheet Documentation  Taken 2021 by Emilia Bearden RN  Body Position: position changed independently  Goal: Stable Fetal Wellbeing  Outcome: Ongoing, Progressing  Intervention: Promote and Monitor Fetal Wellbeing  Recent Flowsheet Documentation  Taken 2021 by Emilia Bearden RN  Body Position: position changed independently     Problem: Infection ( Delivery)  Goal: Absence of Infection Signs and Symptoms  Outcome: Ongoing, Progressing  Goal: Absence of Infection Signs and Symptoms  Outcome: Ongoing, Progressing     Problem: Respiratory Compromise ( Delivery)  Goal: Effective Oxygenation and Ventilation  Outcome: Ongoing, Progressing  Goal: Effective Oxygenation and Ventilation  Outcome: Ongoing, Progressing     Problem: Breastfeeding  Goal: Effective Breastfeeding  Outcome: Ongoing, Progressing     Problem: Skin Injury Risk Increased  Goal: Skin Health and Integrity  Outcome: Ongoing, Progressing   Goal Outcome Evaluation:  Plan of Care Reviewed With: patient        Progress: improving  Outcome Summary: Patient progressing towards goals.

## 2021-08-12 NOTE — ANESTHESIA POSTPROCEDURE EVALUATION
Patient: Freda Curry    Procedure Summary     Date: 21 Room / Location: [facility] LABOR DELIVERY  [facility] LAB[facility]    Anesthesia Start:  Anesthesia Stop:     Procedure:  SECTION REPEAT (N/A Abdomen) Diagnosis: (REPEAT C/S  AND [facility]ANGAROO CARE)    Surgeons: Lori Sandoval DO Provider: William Bowman MD    Anesthesia Type: regional ASA Status: 2          Anesthesia Type: regional    Vitals  Vitals Value Taken Time   BP 95/68 21 0646   Temp 36.7 °C (98.1 °F) 21 0517   Pulse 50 21 0643   Resp 18 21 0517   SpO2 95 % 21 1500   Vitals shown include unvalidated device data.        Post Anesthesia Care and Evaluation    Patient location during evaluation: bedside  Patient participation: complete - patient participated  Level of consciousness: awake  Pain score: 0  Pain management: adequate  Airway patency: patent  Anesthetic complications: No anesthetic complications  PONV Status: none  Cardiovascular status: acceptable and stable  Respiratory status: acceptable and room air  Hydration status: acceptable  Post Neuraxial Block status: Motor and sensory function returned to baseline and No signs or symptoms of PDPH

## 2021-08-13 VITALS
OXYGEN SATURATION: 95 % | WEIGHT: 217 LBS | HEART RATE: 77 BPM | HEIGHT: 59 IN | RESPIRATION RATE: 18 BRPM | BODY MASS INDEX: 43.75 KG/M2 | SYSTOLIC BLOOD PRESSURE: 113 MMHG | TEMPERATURE: 98.4 F | DIASTOLIC BLOOD PRESSURE: 51 MMHG

## 2021-08-13 RX ORDER — ACETAMINOPHEN 325 MG/1
650 TABLET ORAL EVERY 6 HOURS PRN
Qty: 30 TABLET | Refills: 0 | Status: SHIPPED | OUTPATIENT
Start: 2021-08-13 | End: 2021-08-23

## 2021-08-13 RX ORDER — IBUPROFEN 800 MG/1
800 TABLET ORAL EVERY 8 HOURS PRN
Qty: 30 TABLET | Refills: 0 | Status: SHIPPED | OUTPATIENT
Start: 2021-08-13 | End: 2021-08-23

## 2021-08-13 RX ADMIN — DOCUSATE SODIUM 100 MG: 100 CAPSULE, LIQUID FILLED ORAL at 08:29

## 2021-08-13 RX ADMIN — IBUPROFEN 800 MG: 800 TABLET, FILM COATED ORAL at 05:57

## 2021-08-13 RX ADMIN — ACETAMINOPHEN 650 MG: 325 TABLET ORAL at 07:07

## 2021-08-13 NOTE — PROGRESS NOTES
PostPartum/PostOp PROGRESS NOTE        Subjective:  Patient has no complaints, Pain controlled, Tolerating a regular diet, Passing flatus, Ambulating, Urinating spontaneously, Lochia decreasing, no bleeding concerns, No lightheadedness or dizziness      Objective:     Vitals: reviewed  General- NAD, alert and oriented, appropriate  Psych- Normal mood, good memory  CV/Resp- CV- Regular rhythm, no murnurs and Resp- CTA to bases, no wheezes  Abdomen- Fundus non tender, Soft, non distended, non tender, normal active bowel sounds, Incision clean, dry, intact and Staples in place  Ext/DTRs- Trace edema, bilaterally equal, no signs of DVT    Lab Results   Component Value Date    WBC 12.50 (H) 08/12/2021    HGB 11.6 (L) 08/12/2021    HCT 34.4 08/12/2021    MCV 93.0 08/12/2021     08/12/2021         Assessment:    Post-partum/postop Day:  2    Plan:     Routine postpartum/postop care    Discharge home, DC meds reviewed, Follow up scheduled, PP/PO precautions given              Electronically signed by Lori Sandoval DO, 08/13/21, 8:56 AM EDT.

## 2021-08-13 NOTE — LACTATION NOTE
LC in to follow up with lactation progress. Mom states she is still struggling with latching infant and nipple shield is not working. Mom has red and tender nipples especially on the right. Baby is taking the left side better and will go directly to the breast. LC assisted with this feeding and infant was not latching well to left breast so a smaller nipple shield provided and baby latched much easier to this and  this full feeding on this right breast and was very content afterwards. Mom has paulino breasts today and they are very warm. LC encouraged use of the small nipple shield on this right side and to go to the breast on her preferred left side. LC encouraged pumping about 3 times a day and a follow up with lactation dept when needed. Mom is planning on discharge today. LC discussed checking to make sure new medications are safe to breastfeed. LC discussed alcohol use and cigarette/second hand smoke around baby and breastfeeding and discussed the impact of street drugs on infants and breastfeeding. LC used the page in the breastfeeding guide to discuss harmful effects of these. Breastfeeding/Lactation expectations and anticipatory guidance discussed for the next two weeks . LC discussed nipple care, plugged ducts, engorgement, and breast infection. LC encouraged mom to see pediatrician two days from discharge for follow up.  Mom has a breastpump for home use and LC discussed good pumping guidelines and normal expectations with pumping and storage and preparation of ebm for feedings. LC discussed breastfeeding/lactation resources after discharge and when to call the doctor. Mom showed good understanding.

## 2021-08-18 ENCOUNTER — TELEPHONE (OUTPATIENT)
Dept: LACTATION | Facility: HOSPITAL | Age: 31
End: 2021-08-18

## 2021-08-18 NOTE — TELEPHONE ENCOUNTER
ARUN called to check on this patient's breastfeeding progress. Patient states she has not needed the nipple shield since discharge and her pedi has a lactation consultant in their office and she has seen her 2 times for assistance. She states she has no concerns at this time for breastfeeding needs. ARUN reminded her of the lactation dept phone number to use if needed. Patient expressed good understanding over the phone.

## 2021-09-17 ENCOUNTER — POSTPARTUM VISIT (OUTPATIENT)
Dept: OBSTETRICS AND GYNECOLOGY | Facility: CLINIC | Age: 31
End: 2021-09-17

## 2021-09-17 VITALS — SYSTOLIC BLOOD PRESSURE: 110 MMHG | DIASTOLIC BLOOD PRESSURE: 70 MMHG | WEIGHT: 200 LBS | BODY MASS INDEX: 40.4 KG/M2

## 2021-09-17 DIAGNOSIS — E66.01 MORBID OBESITY WITH BMI OF 40.0-44.9, ADULT (HCC): ICD-10-CM

## 2021-09-17 DIAGNOSIS — Z98.891 H/O: C-SECTION: Primary | ICD-10-CM

## 2021-09-17 DIAGNOSIS — Z30.41 ENCOUNTER FOR SURVEILLANCE OF CONTRACEPTIVE PILLS: ICD-10-CM

## 2021-09-17 PROBLEM — Z30.9 CONTRACEPTION MANAGEMENT: Status: ACTIVE | Noted: 2021-09-17

## 2021-09-17 PROCEDURE — 99213 OFFICE O/P EST LOW 20 MIN: CPT | Performed by: OBSTETRICS & GYNECOLOGY

## 2021-09-17 RX ORDER — ACETAMINOPHEN AND CODEINE PHOSPHATE 120; 12 MG/5ML; MG/5ML
1 SOLUTION ORAL DAILY
Qty: 90 TABLET | Refills: 1 | Status: SHIPPED | OUTPATIENT
Start: 2021-09-17 | End: 2022-04-05

## 2021-09-17 NOTE — PROGRESS NOTES
POSTPARTUM Follow Up Visit    CC:  Postpartum     HPI:      • Antepartum or Postpartum complications: Elevated BMI, Prior  and GDMA1  • Delivery type:            Perineum: NA  • Feeding: Breast  • Pain:  No  • Vaginal Bleeding:  No  • EPDS score: 3  • Plans for BC:  Progesterone-only pill and Vasectomy   Last PAP: 2020 neg        PHYSICAL EXAM:  /70   Wt 90.7 kg (200 lb)   BMI 40.40 kg/m²   General- NAD, alert and oriented, appropriate  Psych- Normal mood, good memory    Incision: Clean, dry, intact, no evidence of infection  Abdomen- Soft, non distended, non tender, no masses    External genitalia- Normal.    Urethra/Bladder/Vagina- Normal, no masses, non-tender, no prolapse     Cvx- Normal, no lesions, no discharge, no CMT  Uterus- Normal size, shape & consistency.  Non tender, mobile, & no prolapse  Adnexa- Normal, no mass, non-tender    Lymphatic- No palpable groin nodes  Ext- No edema, no cyanosis   Skin- No lesions, no rashes, no acanthosis nigricans      ASSESSMENT AND PLAN:  Diagnoses and all orders for this visit:    1. H/O:  (Primary)    2. GDMA1  -     Glucose, Post 75 GM Glucola; Future    3. Morbid obesity with BMI of 40.0-44.9, adult (CMS/[facility])    4. Encounter for surveillance of contraceptive pills  -     norethindrone (MICRONOR) 0.35 MG tablet; Take 1 tablet by mouth Daily.  Dispense: 90 tablet; Refill: 1    5. Postpartum follow-up        Counseling:  May resume intercourse, May resume normal activities, Core strengthening exercises reviewed and recommended, Kegel exercises reviewed and recommended, Ok to return to work/school     Pt given Myriad packet to review w ordering provider.        Follow Up:  Return in about 4 months (around 2022) for WWE and next 1-2MONTHS w BETH JOHNSON TO REVIEW MYRIAD RESULTS.          Lori Sandoval,   2021    [facility]  [address] [place] [address]  Dept:  [phone]  Dept Fax: [phone]  Loc: [phone]  Loc Fax: [phone]

## 2021-09-20 ENCOUNTER — TELEPHONE (OUTPATIENT)
Dept: OBSTETRICS AND GYNECOLOGY | Facility: CLINIC | Age: 31
End: 2021-09-20

## 2021-10-15 ENCOUNTER — OFFICE VISIT (OUTPATIENT)
Dept: OBSTETRICS AND GYNECOLOGY | Facility: CLINIC | Age: 31
End: 2021-10-15

## 2021-10-15 VITALS
HEART RATE: 72 BPM | SYSTOLIC BLOOD PRESSURE: 110 MMHG | WEIGHT: 211 LBS | DIASTOLIC BLOOD PRESSURE: 72 MMHG | BODY MASS INDEX: 42.54 KG/M2 | HEIGHT: 59 IN

## 2021-10-15 DIAGNOSIS — Z71.83 ENCOUNTER FOR NONPROCREATIVE GENETIC COUNSELING: Primary | ICD-10-CM

## 2021-10-15 DIAGNOSIS — Z80.3 FAMILY HISTORY OF BREAST CANCER: ICD-10-CM

## 2021-10-15 PROCEDURE — 99213 OFFICE O/P EST LOW 20 MIN: CPT | Performed by: OBSTETRICS & GYNECOLOGY

## 2021-10-15 RX ORDER — CALCIUM CITRATE/VITAMIN D3 200MG-6.25
TABLET ORAL
COMMUNITY
Start: 2021-07-28 | End: 2022-04-05

## 2021-10-15 RX ORDER — LANCETS 28 GAUGE
EACH MISCELLANEOUS 4 TIMES DAILY
COMMUNITY
Start: 2021-07-28 | End: 2022-04-05

## 2021-10-15 NOTE — PROGRESS NOTES
"GYN Problem/Follow Up Visit    Chief Complaint   Patient presents with   • Follow-up     FOLLOW UP MYRAID RESULTS           HPI  Freda Curry is a 31 y.o. female, , who presents for f/u genetic testing. No c/o voiced.       Additional OB/GYN History   No LMP recorded.  Allergies : Penicillins     The additional following portions of the patient's history were reviewed and updated as appropriate: allergies, current medications, past family history, past medical history, past social history, past surgical history and problem list.    Review of Systems    I have reviewed and agree with the HPI, ROS, and historical information as entered above. Julianna Syed, APRN    Objective   /72   Pulse 72   Ht 149.9 cm (59\")   Wt 95.7 kg (211 lb)   BMI 42.62 kg/m²     Physical Exam  Vitals reviewed.   Neurological:      Mental Status: She is alert and oriented to person, place, and time.            Assessment and Plan    Diagnoses and all orders for this visit:    1. Encounter for nonprocreative genetic counseling (Primary)    2. Family history of breast cancer    discussed myriad results. Mutation testing negative. Breast cancer risk score 9.6%. variant is present and its significance was discussed. Recommend routine screening. Pt's questions answered.     Counseling:  She understands the importance of having the above orders performed in a timely fashion.  She is encouraged to review her results online and/or contact or office if she has questions.     Follow Up:  Return if symptoms worsen or fail to improve.      Julianna Syed, APRN  10/15/2021  "

## 2021-10-18 ENCOUNTER — TELEPHONE (OUTPATIENT)
Dept: OBSTETRICS AND GYNECOLOGY | Facility: CLINIC | Age: 31
End: 2021-10-18

## 2021-10-28 ENCOUNTER — APPOINTMENT (OUTPATIENT)
Dept: LAB | Facility: HOSPITAL | Age: 31
End: 2021-10-28

## 2021-11-01 ENCOUNTER — TRANSCRIBE ORDERS (OUTPATIENT)
Dept: LAB | Facility: HOSPITAL | Age: 31
End: 2021-11-01

## 2021-11-01 ENCOUNTER — LAB (OUTPATIENT)
Dept: LAB | Facility: HOSPITAL | Age: 31
End: 2021-11-01

## 2021-11-02 ENCOUNTER — LAB (OUTPATIENT)
Dept: LAB | Facility: HOSPITAL | Age: 31
End: 2021-11-02

## 2021-11-02 DIAGNOSIS — O24.419 GESTATIONAL DIABETES MELLITUS (GDM), ANTEPARTUM, GESTATIONAL DIABETES METHOD OF CONTROL UNSPECIFIED: Primary | ICD-10-CM

## 2021-11-02 LAB
GLUCOSE 1H P 100 G GLC PO SERPL-MCNC: 206 MG/DL (ref 74–180)
GLUCOSE 2H P 100 G GLC PO SERPL-MCNC: 115 MG/DL (ref 74–155)
GLUCOSE BLDC GLUCOMTR-MCNC: 105 MG/DL (ref 70–99)
GLUCOSE P FAST SERPL-MCNC: 107 MG/DL (ref 74–106)

## 2021-11-02 PROCEDURE — 82951 GLUCOSE TOLERANCE TEST (GTT): CPT

## 2021-11-02 PROCEDURE — 36415 COLL VENOUS BLD VENIPUNCTURE: CPT

## 2021-11-03 ENCOUNTER — TELEPHONE (OUTPATIENT)
Dept: OBSTETRICS AND GYNECOLOGY | Facility: CLINIC | Age: 31
End: 2021-11-03

## 2022-04-05 ENCOUNTER — OFFICE VISIT (OUTPATIENT)
Dept: OBSTETRICS AND GYNECOLOGY | Facility: CLINIC | Age: 32
End: 2022-04-05

## 2022-04-05 VITALS
BODY MASS INDEX: 42.13 KG/M2 | HEIGHT: 59 IN | SYSTOLIC BLOOD PRESSURE: 93 MMHG | DIASTOLIC BLOOD PRESSURE: 53 MMHG | HEART RATE: 76 BPM | WEIGHT: 209 LBS

## 2022-04-05 DIAGNOSIS — Z30.09 BIRTH CONTROL COUNSELING: ICD-10-CM

## 2022-04-05 DIAGNOSIS — Z01.419 WELL WOMAN EXAM WITH ROUTINE GYNECOLOGICAL EXAM: Primary | ICD-10-CM

## 2022-04-05 PROCEDURE — 99395 PREV VISIT EST AGE 18-39: CPT | Performed by: OBSTETRICS & GYNECOLOGY

## 2022-04-05 PROCEDURE — G0123 SCREEN CERV/VAG THIN LAYER: HCPCS | Performed by: OBSTETRICS & GYNECOLOGY

## 2022-04-05 PROCEDURE — 87624 HPV HI-RISK TYP POOLED RSLT: CPT | Performed by: OBSTETRICS & GYNECOLOGY

## 2022-04-05 RX ORDER — NORETHINDRONE ACETATE AND ETHINYL ESTRADIOL 1MG-20(21)
1 KIT ORAL DAILY
Qty: 84 TABLET | Refills: 4 | Status: SHIPPED | OUTPATIENT
Start: 2022-04-05 | End: 2023-04-03

## 2022-04-05 RX ORDER — NORETHINDRONE ACETATE AND ETHINYL ESTRADIOL 1MG-20(21)
1 KIT ORAL DAILY
Qty: 28 TABLET | Refills: 12 | Status: SHIPPED | OUTPATIENT
Start: 2022-04-05 | End: 2022-04-05

## 2022-04-12 LAB
CYTOLOGIST CVX/VAG CYTO: ABNORMAL
CYTOLOGY CVX/VAG DOC CYTO: ABNORMAL
CYTOLOGY CVX/VAG DOC THIN PREP: ABNORMAL
DX ICD CODE: ABNORMAL
HIV 1 & 2 AB SER-IMP: ABNORMAL
HPV I/H RISK 4 DNA CVX QL PROBE+SIG AMP: POSITIVE
OTHER STN SPEC: ABNORMAL
STAT OF ADQ CVX/VAG CYTO-IMP: ABNORMAL

## 2022-04-13 ENCOUNTER — TELEPHONE (OUTPATIENT)
Dept: OBSTETRICS AND GYNECOLOGY | Facility: CLINIC | Age: 32
End: 2022-04-13

## 2022-04-13 PROBLEM — Z30.9 CONTRACEPTION MANAGEMENT: Status: RESOLVED | Noted: 2021-09-17 | Resolved: 2022-04-13

## 2022-04-13 PROBLEM — E66.01 MORBID OBESITY WITH BMI OF 40.0-44.9, ADULT (HCC): Status: RESOLVED | Noted: 2021-09-17 | Resolved: 2022-04-13

## 2022-04-13 PROBLEM — Z98.891 H/O: C-SECTION: Status: RESOLVED | Noted: 2021-08-10 | Resolved: 2022-04-13

## 2022-04-13 PROBLEM — R87.618 PAP SMEAR ABNORMALITY OF CERVIX/HUMAN PAPILLOMAVIRUS (HPV) POSITIVE: Status: ACTIVE | Noted: 2022-04-13

## 2022-04-13 NOTE — TELEPHONE ENCOUNTER
----- Message from Lori Sandoval DO sent at 4/13/2022 12:32 PM EDT -----  PAP results neg, but HPV positive.  Please schedule COLPO.

## 2022-05-23 NOTE — PROGRESS NOTES
Colposcopy    CC:  Pt presents for colposcopy  Chief Complaint   Patient presents with   • Colposcopy       Pregnant: No  Social History     Substance and Sexual Activity   Sexual Activity Yes   • Partners: Male   • Birth control/protection: OCP     Birth control: Progesterone-only pill  Tobacco/Nicotine use:  No  Pap result: Neg/HPV Positive  Uhcg:  Negative  Consent signed: Yes    Procedure reviewed in detail.  She understands the potential risks include, but are not limited to, pain, bleeding, and infection.  Her questions have been answered.    Subjective/HPI:  31 y.o. Patient's last menstrual period was 2022.     IgP, Aptima HPV (2022 15:18)       Objective:  /80   Pulse 84   Wt 96.2 kg (212 lb)   LMP 2022   BMI 43.55 kg/m²   External genitalia- Without lesion   Perineum- Without lesion  Vagina- Without lesion   Cvx- Adequate 100%TZ seen, , Biopsies taken at lesion sites, ECC performed, Monsels for hemostasis  Physical Exam  Genitourinary:           Comments: Blue 100%TZ  Red AWL      Patient tolerated the procedure well.    Assessment and Plan:  Diagnoses and all orders for this visit:    1. Pap smear abnormality of cervix/human papillomavirus (HPV) positive (Primary)  Overview:  2022 neg pap, HPV POS    Orders:  -     Tissue Pathology Exam  -     POC Pregnancy, Urine      Counseling:    We discussed HPV in detail.  Incidence, transmission, course, remission, progression, types, cervical cancer, genital warts, monitoring, and importance of compliance were reviewed.  A HPV handout was provided if she desired.    She understands the need for continued follow up until she is cleared to return to routine screening pap smears.  She understands the importance of follow up and consequences with lack of follow up (i.e. potential for cervical cancer).  Follow up usually ranges every 6months - 12months.      PRECAUTIONS - It is common to have bright red spotting and dark  discharge after today.  If she has had cervical biopsies, she is to avoid intercourse or tampons as directed for 7 days.  She can use OTC pain relievers prn.  She needs to return to office or ER (if after hours/weekends) if she has pelvic pain, bleeding > 1 pad/2 hours, discharge that has a bad vaginal odor or vaginal itching, fever > 101.5, or any other concerns.        Follow Up:  Return for PRN.      Lori Sandoval DO  05/24/2022    [facility]  [facility] [place] [address]  Dept: [phone]  Dept Fax: [phone]  Loc: [phone]  Loc Fax: [phone]

## 2022-05-24 ENCOUNTER — OFFICE VISIT (OUTPATIENT)
Dept: OBSTETRICS AND GYNECOLOGY | Facility: CLINIC | Age: 32
End: 2022-05-24

## 2022-05-24 VITALS
WEIGHT: 212 LBS | BODY MASS INDEX: 43.55 KG/M2 | SYSTOLIC BLOOD PRESSURE: 124 MMHG | HEART RATE: 84 BPM | DIASTOLIC BLOOD PRESSURE: 80 MMHG

## 2022-05-24 DIAGNOSIS — R87.618 PAP SMEAR ABNORMALITY OF CERVIX/HUMAN PAPILLOMAVIRUS (HPV) POSITIVE: Primary | ICD-10-CM

## 2022-05-24 LAB
B-HCG UR QL: NEGATIVE
EXPIRATION DATE: NORMAL
INTERNAL NEGATIVE CONTROL: NORMAL
INTERNAL POSITIVE CONTROL: NORMAL
Lab: NORMAL

## 2022-05-24 PROCEDURE — 88342 IMHCHEM/IMCYTCHM 1ST ANTB: CPT | Performed by: OBSTETRICS & GYNECOLOGY

## 2022-05-24 PROCEDURE — 57454 BX/CURETT OF CERVIX W/SCOPE: CPT | Performed by: OBSTETRICS & GYNECOLOGY

## 2022-05-24 PROCEDURE — 88360 TUMOR IMMUNOHISTOCHEM/MANUAL: CPT | Performed by: OBSTETRICS & GYNECOLOGY

## 2022-05-24 PROCEDURE — 88305 TISSUE EXAM BY PATHOLOGIST: CPT | Performed by: OBSTETRICS & GYNECOLOGY

## 2022-05-24 PROCEDURE — 81025 URINE PREGNANCY TEST: CPT | Performed by: OBSTETRICS & GYNECOLOGY

## 2022-05-27 LAB
CYTO UR: NORMAL
LAB AP CASE REPORT: NORMAL
LAB AP CLINICAL INFORMATION: NORMAL
LAB AP SPECIAL STAINS: NORMAL
PATH REPORT.FINAL DX SPEC: NORMAL
PATH REPORT.GROSS SPEC: NORMAL

## 2022-05-31 ENCOUNTER — TELEPHONE (OUTPATIENT)
Dept: OBSTETRICS AND GYNECOLOGY | Facility: CLINIC | Age: 32
End: 2022-05-31

## 2022-05-31 NOTE — TELEPHONE ENCOUNTER
Patient called back and was advised of her colpo biopsy results (not mammogram). She is already scheduled for her yearly exam next year and understands the importance of keeping that appointment.

## 2022-05-31 NOTE — TELEPHONE ENCOUNTER
----- Message from Lori Sandoval DO sent at 5/27/2022  3:33 PM EDT -----  Colpo LYDIA I on bx and ECC- plan pap and cotest hpv 12mo

## 2023-02-27 ENCOUNTER — TELEPHONE (OUTPATIENT)
Dept: OBSTETRICS AND GYNECOLOGY | Facility: CLINIC | Age: 33
End: 2023-02-27
Payer: COMMERCIAL

## 2023-04-01 DIAGNOSIS — Z30.09 BIRTH CONTROL COUNSELING: ICD-10-CM

## 2023-04-03 RX ORDER — NORETHINDRONE ACETATE AND ETHINYL ESTRADIOL AND FERROUS FUMARATE 1MG-20(21)
KIT ORAL
Qty: 28 TABLET | Refills: 0 | Status: SHIPPED | OUTPATIENT
Start: 2023-04-03

## 2023-05-05 ENCOUNTER — TELEPHONE (OUTPATIENT)
Dept: OBSTETRICS AND GYNECOLOGY | Facility: CLINIC | Age: 33
End: 2023-05-05

## 2023-05-05 DIAGNOSIS — Z30.09 BIRTH CONTROL COUNSELING: ICD-10-CM

## 2023-05-05 RX ORDER — NORETHINDRONE ACETATE AND ETHINYL ESTRADIOL 1MG-20(21)
1 KIT ORAL DAILY
Qty: 28 TABLET | Refills: 0 | Status: SHIPPED | OUTPATIENT
Start: 2023-05-05 | End: 2023-05-08 | Stop reason: SDUPTHER

## 2023-05-05 NOTE — TELEPHONE ENCOUNTER
The patient is requesting refills of her OCP Junel Fe 1/20 (28).  She was last seen on 05/24/2022 by Dr. Sandoval.  Her next follow up is scheduled for 05/08/23, but she just took her last pill and needs to start a new pack.  Per protocol, I have sent one refill to [facility] pharmacy to get her through the weekend.

## 2023-05-08 ENCOUNTER — OFFICE VISIT (OUTPATIENT)
Dept: OBSTETRICS AND GYNECOLOGY | Facility: CLINIC | Age: 33
End: 2023-05-08
Payer: COMMERCIAL

## 2023-05-08 VITALS
HEIGHT: 59 IN | WEIGHT: 220 LBS | DIASTOLIC BLOOD PRESSURE: 84 MMHG | SYSTOLIC BLOOD PRESSURE: 131 MMHG | HEART RATE: 67 BPM | BODY MASS INDEX: 44.35 KG/M2

## 2023-05-08 DIAGNOSIS — Z30.09 BIRTH CONTROL COUNSELING: ICD-10-CM

## 2023-05-08 DIAGNOSIS — Z01.419 WELL WOMAN EXAM: Primary | ICD-10-CM

## 2023-05-08 PROCEDURE — 87624 HPV HI-RISK TYP POOLED RSLT: CPT | Performed by: NURSE PRACTITIONER

## 2023-05-08 PROCEDURE — G0123 SCREEN CERV/VAG THIN LAYER: HCPCS | Performed by: NURSE PRACTITIONER

## 2023-05-08 RX ORDER — NORETHINDRONE ACETATE AND ETHINYL ESTRADIOL 1MG-20(21)
1 KIT ORAL DAILY
Qty: 84 TABLET | Refills: 3 | Status: SHIPPED | OUTPATIENT
Start: 2023-05-08

## 2023-05-08 NOTE — PROGRESS NOTES
"  HPI:   32 y.o. . Presents for well woman exam. Contraception:  Birth control pill  Menses:   q month, lasts 4 days, changes regular pad q 8hrs on heaviest days.   Pain:  None  Last pap: abnormal and NILM, HPV+, [id number]  Complaints: Pt has no complaints today.  IgP, Aptima HPV (2022 15:18)     Past Medical History:   Diagnosis Date   • Asthma    • GDMA1 2021   • Gestational diabetes    • H/O:  08/10/2021   • migraine without aura    • Morbid obesity with BMI of 40.0-44.9, adult 2021   • Pap smear abnormality of cervix/human papillomavirus (HPV) positive    • Polycystic ovary syndrome       Past Surgical History:   Procedure Laterality Date   •  SECTION     •  SECTION N/A 2021    Procedure:  SECTION REPEAT;  Surgeon: Lori Sandoval DO;  Location: [facility] LABOR DELIVERY;  Service: Obstetrics/Gynecology;  Laterality: N/A;   • CHOLECYSTECTOMY     • COLPOSCOPY  2022    LSIL, CIN1   • PARATHYROID GLAND SURGERY      Parathyroid tumor removal      Family History   Problem Relation Age of Onset   • Skin cancer Paternal Grandmother    • Breast cancer Neg Hx    • Ovarian cancer Neg Hx    • Uterine cancer Neg Hx    • Prostate cancer Neg Hx    • Colon cancer Neg Hx      Allergies as of 2023 - Reviewed 2023   Allergen Reaction Noted   • Penicillins Rash 2021        PCP: does manage PMHx and preventative labs    /84   Pulse 67   Ht 148.6 cm (58.5\")   Wt 99.8 kg (220 lb)   LMP 2023 (Approximate)   BMI 45.20 kg/m²     PHYSICAL EXAM: Chaperone present   General- NAD, alert and oriented, appropriate  Psych- Normal mood, good memory  Neck- No masses, no thyroid enlargement  Lymphatic- No palpable neck, axillary, or groin nodes  CV- Regular rhythm, no murmurs  Resp- CTA to bases, no wheezes  Abdomen- Soft, non distended, non tender, no masses  Breast left-  Bilaterally symmetrical, no masses, non tender, no nipple " discharge  Breast right- Bilaterally symmetrical, no masses, non tender, no nipple discharge  External genitalia- Normal female, no lesions  Urethra/meatus- Normal, no masses, non tender, no prolapse  Bladder- Normal, no masses, non tender, no prolapse  Vagina- Normal, no atrophy, no lesions, no discharge, no prolapse  Cvx- Normal, no lesions, no discharge, No cervical motion tenderness  Uterus- Normal size, shape & consistency.  Non tender, mobile, & no prolapse  Adnexa- No mass, non tender  Anus/Rectum/Perineum- Not performed  Ext- No edema, no cyanosis    Skin- No lesions, no rashes, no acanthosis nigricans      ASSESSMENT and PLAN:    Diagnoses and all orders for this visit:    1. Well woman exam (Primary)  -     IgP, Aptima HPV    2. Birth control counseling  -     norethindrone-ethinyl estradiol FE (Junel FE 1/20) 1-20 MG-MCG per tablet; Take 1 tablet by mouth Daily.  Dispense: 84 tablet; Refill: 3        Preventative:   BREAST HEALTH- Breast awareness, self breast exam, MMG and/or MRI reviewed per society guidelines and her individual history. Screen: Updated today  CERVICAL CANCER Screening- Reviewed current ASCCP guidelines for screening w and wo cotest HPV, age specific.  Screen: Updated today  COLON CANCER Screening- Reviewed current medical society guidelines and options.  Screen:  Not medically needed  SEXUAL HEALTH: Declines STD screening  BONE HEALTH- Reviewed current medical society guidelines and options for testing, calcium and vit D intake.  Weight bearing exercise.  DEXA: Not medically needed  VACCINATIONS Recommended: Up to date.  Importance discussed, risk being unvaccinated reviewed.  Questions answered  Smoking status- NON SMOKER  Follow up PCP/Specialist PMHx and Labs  Genetic testing: Does not qualify.  TRACK MENSES, RTO if <q21d, >7d long, heavy or painful.    All BIRTH CONTROL options R/B/A/SE/E of each reviewed in detail.  OCP/hormone use risk THROMBOEMBOLIC RISK reviewed.     SAFE  SEX/condoms importance reviewed.    Weight loss/Nutrition reviewed.    Follow Up:  Return in about 1 year (around 5/8/2024).        Lina Maxwell, APRN  05/08/2023

## 2023-05-16 ENCOUNTER — TELEPHONE (OUTPATIENT)
Dept: OBSTETRICS AND GYNECOLOGY | Facility: CLINIC | Age: 33
End: 2023-05-16
Payer: COMMERCIAL

## 2023-05-16 NOTE — TELEPHONE ENCOUNTER
----- Message from JULIA Marie sent at 5/15/2023  2:43 PM EDT -----  Pap NILM, HPV+  Colposcopy 5-24-22 LSIL (CIN1)  Pap 4-5-22 NILM, HPV+    Per ASCCP guidelines, post colpo surveillance period, repeat cotest in 1 year

## 2024-04-30 DIAGNOSIS — Z30.09 BIRTH CONTROL COUNSELING: ICD-10-CM

## 2024-04-30 RX ORDER — NORETHINDRONE ACETATE AND ETHINYL ESTRADIOL 1MG-20(21)
1 KIT ORAL DAILY
Qty: 84 TABLET | Refills: 0 | Status: SHIPPED | OUTPATIENT
Start: 2024-04-30

## 2024-06-26 DIAGNOSIS — Z30.09 BIRTH CONTROL COUNSELING: ICD-10-CM

## 2024-06-26 RX ORDER — NORETHINDRONE ACETATE AND ETHINYL ESTRADIOL 1MG-20(21)
1 KIT ORAL DAILY
Qty: 84 TABLET | Refills: 0 | Status: SHIPPED | OUTPATIENT
Start: 2024-06-26

## 2024-06-26 NOTE — TELEPHONE ENCOUNTER
Goyo'd refill on Blisovi x 1 # 84.  Last seen 5/8/23.  Next appointment 8/30/24.  Patient informed Rx @ pharmacy

## 2024-06-26 NOTE — TELEPHONE ENCOUNTER
Caller: Freda Curry    Relationship: Self    Best call back number: [phone]    Requested Prescriptions:   Requested Prescriptions     Pending Prescriptions Disp Refills    norethindrone-ethinyl estradiol FE (Blisovi FE 1/20) 1-20 MG-MCG per tablet 84 tablet 0     Sig: Take 1 tablet by mouth Daily.        Pharmacy where request should be sent: [id number] [address] AT [facility] [phone] [facility] [phone] FX     Last office visit with prescribing clinician: 5/8/2023     Next office visit with prescribing clinician: 8/20/24 W/ DR CODY (ANNUAL)    Does the patient have less than a 3 day supply:  [] Yes  [x] No    Would you like a call back once the refill request has been completed: [] Yes [x] No

## 2024-08-16 NOTE — PROGRESS NOTES
"Well Woman Visit    CC: Scheduled annual well gyn visit  Chief Complaint   Patient presents with    Gynecologic Exam       HPI:   33 y.o.   Social History     Substance and Sexual Activity   Sexual Activity Yes    Partners: Male    Birth control/protection: Birth control pill, OCP     Office Visit with Lori Sandoval DO (2022)  IgPChaitanya HPV (2023 10:53) Pap negative, HPV positive    History of PCOS and gestational diabetes  Menses:   q mo, lasts 2 days, changes products q 5-6hrs on heaviest days, very light.   Pain with menses:  Mild, no OTC meds needed    Pt has no complaints today.  She notes strong FHX of DVT and GF w thrombophilia.     PCP: no recent preventative labs  History: PMHx, Meds, Allergies, PSHx, Social Hx, and POBHx all reviewed and updated.    PHYSICAL EXAM:  /87   Pulse 90   Ht 148.6 cm (58.5\")   Wt 94.8 kg (209 lb)   LMP 2024   BMI 42.94 kg/m²  Not found.   General- NAD, alert and oriented, appropriate  Psych- Normal mood, good memory  Neck- No masses, no thyroid enlargement  CV- Regular rhythm, no murmurs  Resp- CTA to bases, no wheezes  Abdomen- Soft, non distended, non tender, no masses    Chaperone present during breast and/or pelvic exam if performed.  Breast left-  Bilaterally symmetrical, no masses, non tender, no nipple discharge, no axillary or supraclavicular nodes palpable.    Breast right- Bilaterally symmetrical, no masses, non tender, no nipple discharge, no axillary or supraclavicular nodes palpable.      External genitalia- Normal female, no lesions  Urethra/meatus- Normal, no masses, non tender  Bladder- Normal, no masses, non tender  Vagina- Normal, no atrophy, no lesions, no discharge.    Prolapse : none noted, not examined with split speculum to delineate  Cvx- Normal, no lesions, no discharge, No cervical motion tenderness  Uterus- Normal size, shape & consistency.  Non tender, mobile.  Adnexa- No mass, non tender  Anus/Rectum/Perineum- Not " performed    Lymphatic- No palpable neck, axillary, or groin nodes  Ext- No edema, no cyanosis    Skin- No lesions, no rashes, no acanthosis nigricans      ASSESSMENT and PLAN:    Diagnoses and all orders for this visit:    1. Well woman exam (Primary)  -     Lipid Panel  -     TSH    2. Birth control counseling  Comments:  Importance of taking at same time every day.  No OCP for a full mo for thrombophilia panel.  Condoms/abstinence.  Orders:  -     norethindrone (MICRONOR) 0.35 MG tablet; Take 1 tablet by mouth Daily.  Dispense: 90 tablet; Refill: 4    3. Pap smear abnormality of cervix/human papillomavirus (HPV) positive  Overview:  Apr 2022 neg pap, HPV POS, colpo LYDIA I on bx and ECC- plan pap and cotest hpv 12mo    Orders:  -     IgP, Aptima HPV    4. History of gestational diabetes  -     Hemoglobin A1c    5. Family history of thromboembolic disease  Comments:  stop OCP for a full month before checking labs, labs at [facility] only  Orders:  -     Anticardiolipin Antibody, IgG / M, Qn  -     Antithrombin III  -     Antithrombin Antigen  -     Beta-2 Glycoprotein I Antibody,G / M  -     Factor II, DNA Analysis  -     Factor 5 Leiden  -     Lupus Anticoagulant  -     Protein C & S, Functional        Preventative:  BREAST HEALTH- Monthly self breast exam importance and how to reviewed. MMG and/or MRI (prn) reviewed per society guidelines and her individual history. Screen: Not medically needed  CERVICAL CANCER Screening- Reviewed current ASCCP guidelines for screening w and wo cotest HPV, age specific.  Screen: Updated today  COLON CANCER Screening- Reviewed current medical society guidelines and options.  Screen:  Not medically needed  Importance of WEIGHT MANAGEMENT, nutrition, and exercise reviewed.  Ideal BMI discussed  BONE HEALTH- Reviewed current medical society guidelines and options for testing, calcium and vit D intake.  Weight bearing exercise.  DEXA: Not medically needed  VACCINATIONS Recommended: Covid  vaccine, Flu annually.  Importance discussed, risk being unvaccinated reviewed.  Questions answered  Smoking status- NON SMOKER/VAPER          She understands the importance of having any ordered tests to be performed in a timely fashion.  The risks of not performing them include, but are not limited to, advanced cancer stages, bone loss from osteoporosis and/or subsequent increase in morbidity and/or mortality.  She is encouraged to review her results online and/or contact or office if she has questions.     Follow Up:  Return in about 1 year (around 8/20/2025) for WWE.            Lori Sandoval,   08/20/2024    [facility]  [facility] [place] [address]  Dept: [phone]  Dept Fax: [phone]  Loc: [phone]  Loc Fax: [phone]

## 2024-08-20 ENCOUNTER — OFFICE VISIT (OUTPATIENT)
Dept: OBSTETRICS AND GYNECOLOGY | Facility: CLINIC | Age: 34
End: 2024-08-20
Payer: COMMERCIAL

## 2024-08-20 VITALS
DIASTOLIC BLOOD PRESSURE: 87 MMHG | HEART RATE: 90 BPM | WEIGHT: 209 LBS | HEIGHT: 59 IN | SYSTOLIC BLOOD PRESSURE: 132 MMHG | BODY MASS INDEX: 42.13 KG/M2

## 2024-08-20 DIAGNOSIS — Z86.32 HISTORY OF GESTATIONAL DIABETES: ICD-10-CM

## 2024-08-20 DIAGNOSIS — Z82.49 FAMILY HISTORY OF THROMBOEMBOLIC DISEASE: ICD-10-CM

## 2024-08-20 DIAGNOSIS — R87.618 PAP SMEAR ABNORMALITY OF CERVIX/HUMAN PAPILLOMAVIRUS (HPV) POSITIVE: ICD-10-CM

## 2024-08-20 DIAGNOSIS — Z30.09 BIRTH CONTROL COUNSELING: ICD-10-CM

## 2024-08-20 DIAGNOSIS — Z01.419 WELL WOMAN EXAM: Primary | ICD-10-CM

## 2024-08-20 PROCEDURE — 99213 OFFICE O/P EST LOW 20 MIN: CPT | Performed by: OBSTETRICS & GYNECOLOGY

## 2024-08-20 PROCEDURE — 99395 PREV VISIT EST AGE 18-39: CPT | Performed by: OBSTETRICS & GYNECOLOGY

## 2024-08-20 PROCEDURE — 99459 PELVIC EXAMINATION: CPT | Performed by: OBSTETRICS & GYNECOLOGY

## 2024-08-20 PROCEDURE — 87624 HPV HI-RISK TYP POOLED RSLT: CPT | Performed by: OBSTETRICS & GYNECOLOGY

## 2024-08-20 PROCEDURE — G0123 SCREEN CERV/VAG THIN LAYER: HCPCS | Performed by: OBSTETRICS & GYNECOLOGY

## 2024-08-20 RX ORDER — ACETAMINOPHEN AND CODEINE PHOSPHATE 120; 12 MG/5ML; MG/5ML
1 SOLUTION ORAL DAILY
Qty: 90 TABLET | Refills: 4 | Status: SHIPPED | OUTPATIENT
Start: 2024-08-20

## 2024-08-22 LAB
CYTOLOGIST CVX/VAG CYTO: NORMAL
CYTOLOGY CVX/VAG DOC CYTO: NORMAL
CYTOLOGY CVX/VAG DOC THIN PREP: NORMAL
DX ICD CODE: NORMAL
HPV I/H RISK 4 DNA CVX QL PROBE+SIG AMP: NEGATIVE
Lab: NORMAL
OTHER STN SPEC: NORMAL
STAT OF ADQ CVX/VAG CYTO-IMP: NORMAL

## 2024-09-19 ENCOUNTER — TELEPHONE (OUTPATIENT)
Dept: OBSTETRICS AND GYNECOLOGY | Facility: CLINIC | Age: 34
End: 2024-09-19
Payer: COMMERCIAL

## 2024-09-20 ENCOUNTER — LAB (OUTPATIENT)
Dept: LAB | Facility: HOSPITAL | Age: 34
End: 2024-09-20
Payer: COMMERCIAL

## 2024-09-20 LAB
AT III PPP CHRO-ACNC: 87 % (ref 94–138)
CHOLEST SERPL-MCNC: 127 MG/DL (ref 0–200)
HBA1C MFR BLD: 5.3 % (ref 4.8–5.6)
HDLC SERPL-MCNC: 51 MG/DL (ref 40–60)
LDLC SERPL CALC-MCNC: 48 MG/DL (ref 0–100)
LDLC/HDLC SERPL: 0.83 {RATIO}
TRIGL SERPL-MCNC: 168 MG/DL (ref 0–150)
TSH SERPL DL<=0.05 MIU/L-ACNC: 1.02 UIU/ML (ref 0.27–4.2)
VLDLC SERPL-MCNC: 28 MG/DL (ref 5–40)

## 2024-09-20 PROCEDURE — 85306 CLOT INHIBIT PROT S FREE: CPT | Performed by: OBSTETRICS & GYNECOLOGY

## 2024-09-20 PROCEDURE — 86146 BETA-2 GLYCOPROTEIN ANTIBODY: CPT | Performed by: OBSTETRICS & GYNECOLOGY

## 2024-09-20 PROCEDURE — 84443 ASSAY THYROID STIM HORMONE: CPT | Performed by: OBSTETRICS & GYNECOLOGY

## 2024-09-20 PROCEDURE — 85613 RUSSELL VIPER VENOM DILUTED: CPT | Performed by: OBSTETRICS & GYNECOLOGY

## 2024-09-20 PROCEDURE — 85301 ANTITHROMBIN III ANTIGEN: CPT | Performed by: OBSTETRICS & GYNECOLOGY

## 2024-09-20 PROCEDURE — 80061 LIPID PANEL: CPT | Performed by: OBSTETRICS & GYNECOLOGY

## 2024-09-20 PROCEDURE — 85303 CLOT INHIBIT PROT C ACTIVITY: CPT | Performed by: OBSTETRICS & GYNECOLOGY

## 2024-09-20 PROCEDURE — 85300 ANTITHROMBIN III ACTIVITY: CPT | Performed by: OBSTETRICS & GYNECOLOGY

## 2024-09-20 PROCEDURE — 85670 THROMBIN TIME PLASMA: CPT | Performed by: OBSTETRICS & GYNECOLOGY

## 2024-09-20 PROCEDURE — 83036 HEMOGLOBIN GLYCOSYLATED A1C: CPT | Performed by: OBSTETRICS & GYNECOLOGY

## 2024-09-20 PROCEDURE — 85732 THROMBOPLASTIN TIME PARTIAL: CPT | Performed by: OBSTETRICS & GYNECOLOGY

## 2024-09-20 PROCEDURE — 86147 CARDIOLIPIN ANTIBODY EA IG: CPT | Performed by: OBSTETRICS & GYNECOLOGY

## 2024-09-20 PROCEDURE — 85705 THROMBOPLASTIN INHIBITION: CPT | Performed by: OBSTETRICS & GYNECOLOGY

## 2024-09-21 DIAGNOSIS — D68.59 THROMBOPHILIA DUE TO ANTITHROMBIN III DEFICIENCY: Primary | ICD-10-CM

## 2024-09-21 LAB
CARDIOLIPIN IGG SER IA-ACNC: 61 GPL U/ML (ref 0–14)
CARDIOLIPIN IGM SER IA-ACNC: 50 MPL U/ML (ref 0–12)

## 2024-09-23 DIAGNOSIS — D68.59 THROMBOPHILIA DUE TO ANTITHROMBIN III DEFICIENCY: Primary | ICD-10-CM

## 2024-09-23 DIAGNOSIS — R89.9 ABNORMAL LABORATORY TEST: ICD-10-CM

## 2024-09-23 PROBLEM — R76.0 ANTI-CARDIOLIPIN ANTIBODY POSITIVE: Status: ACTIVE | Noted: 2024-09-23

## 2024-09-23 LAB
APTT HEX PL PPP: 8 SEC (ref 0–11)
APTT SCREEN TO CONFIRM RATIO: 1.05 RATIO (ref 0–1.34)
AT III AG ACT/NOR PPP IA: 83 % (ref 72–124)
B2 GLYCOPROT1 IGG SER-ACNC: <9 GPI IGG UNITS (ref 0–20)
B2 GLYCOPROT1 IGM SER-ACNC: 18 GPI IGM UNITS (ref 0–32)
CONFIRM APTT/NORMAL: 32.8 SEC (ref 0–47.6)
F5 GENE MUT ANL BLD/T: NORMAL
FACTOR II, DNA ANALYSIS: NORMAL
LA 2 SCREEN W REFLEX-IMP: ABNORMAL
MIXING APTT: 47.5 SEC (ref 0–40.5)
PROT C ACT/NOR PPP: 97 % (ref 73–180)
PROT S ACT/NOR PPP: 49 % (ref 63–140)
SCREEN APTT: 51 SEC (ref 0–43.5)
SCREEN DRVVT: 42.6 SEC (ref 0–47)
THROMBIN TIME: 14.9 SEC (ref 0–23)

## 2024-09-24 ENCOUNTER — TELEPHONE (OUTPATIENT)
Dept: OBSTETRICS AND GYNECOLOGY | Facility: CLINIC | Age: 34
End: 2024-09-24
Payer: COMMERCIAL

## 2024-10-04 ENCOUNTER — CONSULT (OUTPATIENT)
Dept: ONCOLOGY | Facility: HOSPITAL | Age: 34
End: 2024-10-04
Payer: COMMERCIAL

## 2024-10-04 VITALS
WEIGHT: 209.6 LBS | TEMPERATURE: 97.2 F | BODY MASS INDEX: 42.25 KG/M2 | RESPIRATION RATE: 16 BRPM | HEART RATE: 58 BPM | DIASTOLIC BLOOD PRESSURE: 53 MMHG | SYSTOLIC BLOOD PRESSURE: 118 MMHG | OXYGEN SATURATION: 100 % | HEIGHT: 59 IN

## 2024-10-04 DIAGNOSIS — D68.59: Primary | ICD-10-CM

## 2024-10-04 DIAGNOSIS — Z82.49 FAMILY HISTORY OF THROMBOEMBOLIC DISEASE: ICD-10-CM

## 2024-10-04 NOTE — PROGRESS NOTES
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    Subjective    History of Present Illness    Ms. Freda Curry is a very pleasant 34-year-old  female who presents today as hematology new patient referral from her GYN: Dr. Lori Sandoval.     PMH includes: gestational diabetes. She is a non-smoker. Denies any alcohol use.         Patient reports a strong history of thrombophilia on both side of her parents.  Her mother had a ovarian thrombosis.  Paternal grandfather had a DVT.  Paternal grandmother and grandfather both had some kind of blood clots.  Sister has had a DVT after a vascular surgery within a week of surgery.  Patient herself has never had a DVT or a PE.  Previously was taking Micronor and has discontinued currently.  She was instructed by her GYN to stop for now.  Her  is considering vasectomy in the near future but has not completed.  Thrombophilia evaluation was initiated by her GYN office. Patient reports she has additional lab work scheduled for 3 months from now.     9/20/2024: Anticardiolipin IgG 61  Anticardiolipin IgM low 50  Beta-2 glycoprotein IgG less than 9 normal.  Beta-2 glycoprotein IgM 18 normal.   Factor II DNA analysis normal  Factor V Leiden normal  Antithrombin activity is 87 %  Lupus anticoagulant screen: Lupus anticoagulant was not detected  Protein C functional is 97  Protein S functional is low at 49              Oncology/Hematology History    No history exists.       Review of Systems   Constitutional: Negative.    HENT: Negative.     Eyes: Negative.    Respiratory: Negative.     Cardiovascular: Negative.    Gastrointestinal: Negative.    Endocrine: Negative.    Genitourinary: Negative.    Musculoskeletal: Negative.     Skin: Negative.    Allergic/Immunologic: Negative.    Neurological: Negative.    Hematological: Negative.    Psychiatric/Behavioral: Negative.     All other systems reviewed and are negative.      Current Outpatient Medications on File Prior to Visit   Medication Sig Dispense Refill    norethindrone (MICRONOR) 0.35 MG tablet Take 1 tablet by mouth Daily. (Patient not taking: Reported on 10/4/2024) 90 tablet 4     No current facility-administered medications on file prior to visit.       Allergies   Allergen Reactions    Penicillins Rash     Past Medical History:   Diagnosis Date    Asthma     Female infertility     H/O:  08/10/2021    History of gestational diabetes     HPV (human papilloma virus) infection     migraine without aura     Pap smear abnormality of cervix/human papillomavirus (HPV) positive     Polycystic ovary syndrome      Past Surgical History:   Procedure Laterality Date     SECTION       SECTION N/A 2021    Procedure:  SECTION REPEAT;  Surgeon: Lori Sandoval DO;  Location: [facility] LABOR DELIVERY;  Service: Obstetrics/Gynecology;  Laterality: N/A;    CHOLECYSTECTOMY      COLPOSCOPY  2022    LSIL, CIN1    PARATHYROID GLAND SURGERY      Parathyroid tumor removal    WISDOM TOOTH EXTRACTION       Social History     Socioeconomic History    Marital status:      Spouse name: Dirk    Number of children: 2   Tobacco Use    Smoking status: Never    Smokeless tobacco: Never   Vaping Use    Vaping status: Never Used   Substance and Sexual Activity    Alcohol use: Never    Drug use: Never    Sexual activity: Yes     Partners: Male     Birth control/protection: Birth control pill, OCP     Family History   Problem Relation Age of Onset    Diabetes Father     Hypertension Father     Deep vein thrombosis Mother         after hyst    Deep vein thrombosis Sister         w hx of vein surgery    Cervical cancer Sister     Skin cancer Paternal  Grandmother     Melanoma Paternal Grandmother     Breast cancer Maternal Grandmother     Deep vein thrombosis Maternal Grandfather         on blood thinners and + thrombophilia    Uterine cancer Neg Hx     Prostate cancer Neg Hx     Colon cancer Neg Hx     Pulmonary embolism Neg Hx     Ovarian cancer Neg Hx      Immunization History   Administered Date(s) Administered    COVID-19 (MODERNA) 1st,2nd,3rd Dose Monovalent 05/12/2021, 06/09/2021    Fluzone  >6mos 12/02/2015, 09/16/2024    Fluzone (or Fluarix & Flulaval for VFC) >6mos 10/06/2023    PPD Test 08/03/2015    Tdap 04/25/2016, 07/20/2021       Tobacco Use: Low Risk  (10/4/2024)    Patient History     Smoking Tobacco Use: Never     Smokeless Tobacco Use: Never     Passive Exposure: Not on file       Objective     Physical Exam  Vitals and nursing note reviewed.   Constitutional:       Appearance: Normal appearance.   HENT:      Head: Normocephalic.      Nose: Nose normal.      Mouth/Throat:      Mouth: Mucous membranes are moist.   Eyes:      Extraocular Movements: Extraocular movements intact.   Cardiovascular:      Rate and Rhythm: Normal rate and regular rhythm.      Pulses: Normal pulses.      Heart sounds: Normal heart sounds.   Pulmonary:      Effort: Pulmonary effort is normal.      Breath sounds: Normal breath sounds.   Abdominal:      General: Bowel sounds are normal.      Palpations: Abdomen is soft.   Musculoskeletal:         General: Normal range of motion.      Cervical back: Normal range of motion and neck supple.   Skin:     General: Skin is warm and dry.      Capillary Refill: Capillary refill takes less than 2 seconds.   Neurological:      General: No focal deficit present.      Mental Status: She is alert and oriented to person, place, and time.   Psychiatric:         Mood and Affect: Mood normal.         Behavior: Behavior normal.         Thought Content: Thought content normal.         Judgment: Judgment normal.         Vitals:    10/04/24  "1030   BP: 118/53   Pulse: 58   Resp: 16   Temp: 97.2 °F (36.2 °C)   TempSrc: Temporal   SpO2: 100%   Weight: 95.1 kg (209 lb 9.6 oz)   Height: 149.9 cm (59\")   PainSc: 0-No pain       Wt Readings from Last 3 Encounters:   10/04/24 95.1 kg (209 lb 9.6 oz)   08/20/24 94.8 kg (209 lb)   05/08/23 99.8 kg (220 lb)               ECOG score: 0         ECOG: (0) Fully Active - Able to Carry On All Pre-disease Performance Without Restriction  Fall Risk Assessment was completed, and patient is at low risk for falls.  PHQ-9 Total Score: 0       The patient is  experiencing fatigue. Fatigue score: 1    PT/OT Functional Screening: PT fx screen : No needs identified  Speech Functional Screening: Speech fx screen : No needs identified  Rehab to be ordered: Rehab to be ordered : No needs identified        Result Review :  The following data was reviewed by: JULIA Schwarz on 10/04/2024:  Lab Results   Component Value Date    HGB 11.6 (L) 08/12/2021    HCT 34.4 08/12/2021    MCV 93.0 08/12/2021     08/12/2021    WBC 12.50 (H) 08/12/2021    NEUTROABS 8.64 (H) 08/12/2021    LYMPHSABS 2.76 08/12/2021    MONOSABS 0.93 (H) 08/12/2021    EOSABS 0.06 08/12/2021    BASOSABS 0.05 08/12/2021     No results found for: \"GLUCOSE\", \"BUN\", \"CREATININE\", \"NA\", \"K\", \"CL\", \"CO2\", \"CALCIUM\", \"PROTEINTOT\", \"ALBUMIN\", \"BILITOT\", \"ALKPHOS\", \"AST\", \"ALT\"     No results found for: \"IRON\", \"LABIRON\", \"TRANSFERRIN\", \"TIBC\"  No results found for: \"LDH\", \"FERRITIN\", \"ZZILROSX11\", \"FOLATE\"  No results found for: \"PSA\", \"CEA\", \"AFP\", \"\", \"\"    No Images in the past 120 days found..         Assessment and Plan:  Diagnoses and all orders for this visit:    1. 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 Family history of thromboembolic disease        Strong family history of thromboembolism in multiple family members on both sides of her family.  No prior family members have been tested for inheritable mutations " or thrombophilia.     Patient tested at GYN.  Results showed the anticardiolipin IgG to be increased as well as the anticardiolipin IgM.  Both of these are in the low to medium positive ratio.  Testing with a level greater than 80 would be indicative of high risk for thrombophilia.     Beta-2 glycoprotein IgG level is less than 9 and is within normal limits.  Beta-2 glycoprotein IgM is 18 and follows within the reference range and is normal.  Antithrombin activity is 87% with normal parameters of 94 to 138% activity.  Levels that are less than 60% would be high risk for thrombotic episode.  Lupus anticoagulant testing was not detected.  Protein C functional levels were normal at 97.  Protein S functional level was 49 with normal range of 63 to 140%.       We discussed since the patient has never had a PE nor a DVT she does not need to be on any chronic anticoagulation.     We discussed signs and symptoms of PE and DVT.     She is scheduled to have repeat lab work again in 12 weeks with GYN. We will plan to see her back in 4 months to review lab work that GYN has ordered.     Recommend to stay off of OCP use. Her  is planning to have vasectomy. They have 2 children.     I spent 30 minutes caring for Freda on this date of service. This time includes time spent by me in the following activities:preparing for the visit, reviewing tests, obtaining and/or reviewing a separately obtained history, performing a medically appropriate examination and/or evaluation , counseling and educating the patient/family/caregiver, ordering medications, tests, or procedures, referring and communicating with other health care professionals , documenting information in the medical record, and independently interpreting results and communicating that information with the patient/family/caregiver    Patient Follow Up: MD in 4 months.    Patient was given instructions and counseling regarding her condition or for health maintenance  advice. Please see specific information pulled into the AVS if appropriate.     Peggy Cotton, APRN    10/4/2024    .tob

## 2024-10-05 DIAGNOSIS — R89.9 ABNORMAL LABORATORY TEST: Primary | ICD-10-CM

## 2024-10-07 LAB
APTT 1H NP PPP: NORMAL S
APTT IMM NP PPP: NORMAL S
APTT PPP 1:1 SALINE: NORMAL S
APTT PPP: 27.2 SEC (ref 22.9–30.2)
SPECIMEN STATUS: NORMAL

## 2024-10-10 ENCOUNTER — LAB (OUTPATIENT)
Dept: OBSTETRICS AND GYNECOLOGY | Facility: CLINIC | Age: 34
End: 2024-10-10
Payer: COMMERCIAL

## 2024-10-14 ENCOUNTER — LAB (OUTPATIENT)
Dept: LAB | Facility: HOSPITAL | Age: 34
End: 2024-10-14
Payer: COMMERCIAL

## 2024-10-14 ENCOUNTER — TELEPHONE (OUTPATIENT)
Dept: OBSTETRICS AND GYNECOLOGY | Facility: CLINIC | Age: 34
End: 2024-10-14

## 2024-10-14 DIAGNOSIS — Z82.49 FAMILY HISTORY OF THROMBOEMBOLIC DISEASE: Primary | ICD-10-CM

## 2024-10-14 LAB — APTT PPP: 32.6 SECONDS (ref 24.2–34.2)

## 2024-10-14 PROCEDURE — 36415 COLL VENOUS BLD VENIPUNCTURE: CPT

## 2024-10-14 PROCEDURE — 85730 THROMBOPLASTIN TIME PARTIAL: CPT | Performed by: OBSTETRICS & GYNECOLOGY

## 2024-10-14 NOTE — TELEPHONE ENCOUNTER
Caller: SHARON FROM REGISTRATION [facility]    Relationship: NONE      What orders are you requesting (i.e. lab or imaging): ORDERS FOR PTT    In what timeframe would the patient need to come in: ASAP PT IS AT THE LAB    Where will you receive your lab/imaging services: [facility]    Additional notes: PT IS CURRENTLY AT THE LAB AND IS REQUESTING THE PTT ORDER BE RESENT

## 2024-10-14 NOTE — TELEPHONE ENCOUNTER
Called patient to inform her about the need for her lab to be recollected. Left message to call the office back at her earliest convenience. AE10/14/2024/0827

## 2024-12-20 ENCOUNTER — LAB (OUTPATIENT)
Dept: OBSTETRICS AND GYNECOLOGY | Facility: CLINIC | Age: 34
End: 2024-12-20
Payer: COMMERCIAL

## 2024-12-20 DIAGNOSIS — R89.9 ABNORMAL LABORATORY TEST: ICD-10-CM

## 2024-12-20 PROCEDURE — 86147 CARDIOLIPIN ANTIBODY EA IG: CPT | Performed by: OBSTETRICS & GYNECOLOGY

## 2024-12-21 LAB
CARDIOLIPIN IGG SER IA-ACNC: 45 GPL U/ML (ref 0–14)
CARDIOLIPIN IGM SER IA-ACNC: 37 MPL U/ML (ref 0–12)

## 2024-12-23 PROBLEM — D68.61 ANTIPHOSPHOLIPID ANTIBODY SYNDROME: Status: ACTIVE | Noted: 2024-09-23

## 2024-12-31 NOTE — PROGRESS NOTES
GYN Visit    Chief Complaint   Patient presents with    Follow-up     Labs       HPI:   34 y.o. LMP: Patient's last menstrual period was 2024.     Social History     Substance and Sexual Activity   Sexual Activity Yes    Partners: Male    Birth control/protection: Condom     IgP, Aptima HPV (2024 15:59) Pap negative, HPV negative    Progress Notes by Lori Sandoval,  (2024 15:00)    Hx PCOS and GDM.  Strong family history DVT and grandfather with thrombophilia    Here for follow-up labs.  Anticardiolipin IgG and IgM both elevated x 2, pt wo clinical criteria to meet definition of APAS.      Antithrombin III deficiency and protein S deficiency also noted.  Referred to hematology and no anticoag needed due to no personal hx MATTHEW.  Has MAGGIE w Dr. Álvarez for further recs soon.     Plans vasectomy at the end of the month, is already scheduled.  Declines birth control open till he is cleared.  Plans condoms.    No menses off OCPs.  Known PCOS.  Metformin in past no wt loss and had SE.  LMP  Sept      History: PMHx, Meds, Allergies, PSHx, Social Hx, and POBHx all reviewed and updated.    PHYSICAL EXAM:  /74   Wt 99.8 kg (220 lb)   LMP 2024 Comment: States not currently taking birth control and is not having periods.  BMI 44.43 kg/m²   Facility age limit for growth %windy is 20 years.   Chaperone present during breast and/or pelvic exam if performed.  General- NAD, alert and oriented, appropriate  Psych- Normal mood, good memory    ASSESSMENT AND PLAN:  Diagnoses and all orders for this visit:    1. Anticardiolipin antibody positive (Primary)  Comments:  Increased risk blood clots arteries veins or organs, stroke, heart attack,miscarriage and stillbirth.  MATTHEW/MI/CVA precautions  Overview:  Strong Fhx DVT.  Pt wo clinical criteria, does not meet criteria for APAS  Anticardiolipin Antibody, IgG / M, Qn (2024 17:44)  IgG 61/IgM 50  Anticardiolipin Antibody, IgG / M, Qn (2024 12:47)   IgG 45/IgM 37          2. Protein S deficiency  Overview:  Protein C & S, Functional (09/20/2024 17:44) Prot S, 49      3. 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 Family history of thromboembolic disease  Overview:  Labs checked OFF OCPS  2024 AT III deficiency (nl ATIII Ag), Prot S def,  OLE IgG (62) and IgM (50) elevated x2 cw APAS- referred to hematology in Sept 2024  Nl factor II, Neg FVL. Neg W9eclvh pro.  PTT elevated- repeat wnl      5. Polycystic ovary syndrome  Overview:  TSH wnl  SE w metformin    Orders:  -     T4, Free  -     Prolactin  -     Progesterone (PROMETRIUM) 200 MG capsule; Take 2 capsules by mouth Daily for 10 days.  Dispense: 20 capsule; Refill: 0    6. Amenorrhea  Comments:  will complete workup, suspect secondary to PCOS  Orders:  -     T4, Free  -     Prolactin  -     Progesterone (PROMETRIUM) 200 MG capsule; Take 2 capsules by mouth Daily for 10 days.  Dispense: 20 capsule; Refill: 0  -     hCG, Quantitative, Pregnancy    7. Birth control counseling  Comments:  Condoms until cleared from vasectomy      PCOS- Discussed Dx, Tx, weight, pregnancy, periods/anovulation, cholesterol, insulin, and uterine cancer risk discussed w pt. portance on withdrawal bleed at least every 3 months.      Follow Up:  Return in about 1 year (around 1/3/2026) for WWE.          Lori Sandoval DO  01/03/2025    [facility]  [address] [place] [address] [address] [place] [address]  Dept: [phone]  Dept Fax: [phone]  Loc: [phone]  Loc Fax: [phone]

## 2025-01-03 ENCOUNTER — OFFICE VISIT (OUTPATIENT)
Dept: OBSTETRICS AND GYNECOLOGY | Facility: CLINIC | Age: 35
End: 2025-01-03
Payer: COMMERCIAL

## 2025-01-03 VITALS — WEIGHT: 220 LBS | BODY MASS INDEX: 44.43 KG/M2 | SYSTOLIC BLOOD PRESSURE: 120 MMHG | DIASTOLIC BLOOD PRESSURE: 74 MMHG

## 2025-01-03 DIAGNOSIS — Z30.09 BIRTH CONTROL COUNSELING: ICD-10-CM

## 2025-01-03 DIAGNOSIS — D68.59 PROTEIN S DEFICIENCY: ICD-10-CM

## 2025-01-03 DIAGNOSIS — D68.59 THROMBOPHILIA DUE TO ANTITHROMBIN III DEFICIENCY: ICD-10-CM

## 2025-01-03 DIAGNOSIS — R76.0 ANTICARDIOLIPIN ANTIBODY POSITIVE: Primary | ICD-10-CM

## 2025-01-03 DIAGNOSIS — Z82.49 FAMILY HISTORY OF THROMBOEMBOLIC DISEASE: ICD-10-CM

## 2025-01-03 DIAGNOSIS — E28.2 POLYCYSTIC OVARY SYNDROME: ICD-10-CM

## 2025-01-03 DIAGNOSIS — N91.2 AMENORRHEA: ICD-10-CM

## 2025-01-03 LAB
HCG INTACT+B SERPL-ACNC: <1 MIU/ML
PROLACTIN SERPL-MCNC: 9.61 NG/ML (ref 4.79–23.3)
T4 FREE SERPL-MCNC: 1.18 NG/DL (ref 0.92–1.68)

## 2025-01-03 PROCEDURE — 99214 OFFICE O/P EST MOD 30 MIN: CPT | Performed by: OBSTETRICS & GYNECOLOGY

## 2025-01-03 PROCEDURE — 84439 ASSAY OF FREE THYROXINE: CPT | Performed by: OBSTETRICS & GYNECOLOGY

## 2025-01-03 PROCEDURE — 84702 CHORIONIC GONADOTROPIN TEST: CPT | Performed by: OBSTETRICS & GYNECOLOGY

## 2025-01-03 PROCEDURE — 84146 ASSAY OF PROLACTIN: CPT | Performed by: OBSTETRICS & GYNECOLOGY

## 2025-01-03 RX ORDER — PROGESTERONE 200 MG/1
400 CAPSULE ORAL DAILY
Qty: 20 CAPSULE | Refills: 0 | Status: SHIPPED | OUTPATIENT
Start: 2025-01-03 | End: 2025-01-13

## 2025-01-16 DIAGNOSIS — N91.2 AMENORRHEA: ICD-10-CM

## 2025-01-16 DIAGNOSIS — E28.2 POLYCYSTIC OVARY SYNDROME: ICD-10-CM

## 2025-01-16 RX ORDER — PROGESTERONE 200 MG/1
400 CAPSULE ORAL DAILY
Qty: 20 CAPSULE | Refills: 0 | OUTPATIENT
Start: 2025-01-16 | End: 2025-01-26

## 2025-01-16 NOTE — TELEPHONE ENCOUNTER
Please reference the telephone encounter from 01/10/25 for more information.  Pt instructed to not take the Rx if she does not start her cycle in the next 3 months and she just picked that up.  I have denied this refill because it is too soon.        Lori Sandoval, DO to [facility] Clinical [facility]  1/13/25  3:17 PM  I would consider this a menstrual cycle even though it was light.  I would start counting from this menstrual cycle and if she does not have a period in 3 months then she can take her progesterone.

## 2025-01-17 DIAGNOSIS — H91.90 HEARING LOSS, UNSPECIFIED HEARING LOSS TYPE, UNSPECIFIED LATERALITY: Primary | ICD-10-CM

## 2025-01-24 ENCOUNTER — PROCEDURE VISIT (OUTPATIENT)
Dept: OTOLARYNGOLOGY | Facility: CLINIC | Age: 35
End: 2025-01-24
Payer: COMMERCIAL

## 2025-01-24 DIAGNOSIS — H90.12 CONDUCTIVE HEARING LOSS OF LEFT EAR, UNSPECIFIED HEARING STATUS ON CONTRALATERAL SIDE: ICD-10-CM

## 2025-01-24 DIAGNOSIS — H90.A11 CONDUCTIVE HEARING LOSS OF RIGHT EAR WITH RESTRICTED HEARING OF LEFT EAR: Primary | ICD-10-CM

## 2025-01-24 PROCEDURE — 92567 TYMPANOMETRY: CPT | Performed by: AUDIOLOGIST

## 2025-01-24 PROCEDURE — 92557 COMPREHENSIVE HEARING TEST: CPT | Performed by: AUDIOLOGIST

## 2025-01-27 NOTE — PROGRESS NOTES
AUDIOMETRIC EVALUATION      Name:  Freda Curry  :  1990  Age:  34 y.o.  Date of Evaluation:  2025       History: Mrs. Curry is seen today for a hearing evaluation due to aural fullness at the left ear.    Audiologic Information:  Concerns for Hearing: Yes  PETs: No  Other otologic surgical history: Yes PE tubes  Aural Pressure/Fullness: No  Otalgia: None  Otorrhea: No  Tinnitus: Yes periodically  Dizziness: No  Noise Exposure: None  Family history of hearing loss: Unknown  Head trauma requiring hospital stay: No  Chemotherapy: No  Other significant history: No    EVALUATION:    See audiogram    RESULTS:    Otoscopic Evaluation:  Right: Tympanosclerosis  Left: Tympanosclerosis    Tympanometry (226 Hz):  Right: Type A  Left: Type A      IMPRESSIONS:  Pure tone thresholds for the right ear indicates a mild conductive hearing loss at 0.2 5K-2K hertz.  Pure tone thresholds for the left ear indicates a mild conductive hearing loss at 0.2 5K-1K hertz.  Patient was counseled with regard to the findings.    RECOMMENDATIONS/PLAN:  Annual audiogram  Eustachian tube protocol  Discussed results and recommendations with patient. Questions were addressed and the patient was encouraged to contact our department should concerns arise.          Justice Elizabeth M.S, [facility]  Licensed Audiologist

## 2025-02-05 ENCOUNTER — OFFICE VISIT (OUTPATIENT)
Dept: ONCOLOGY | Facility: HOSPITAL | Age: 35
End: 2025-02-05
Payer: COMMERCIAL

## 2025-02-05 ENCOUNTER — LAB (OUTPATIENT)
Dept: ONCOLOGY | Facility: HOSPITAL | Age: 35
End: 2025-02-05
Payer: COMMERCIAL

## 2025-02-05 VITALS
OXYGEN SATURATION: 100 % | WEIGHT: 210.2 LBS | DIASTOLIC BLOOD PRESSURE: 78 MMHG | RESPIRATION RATE: 18 BRPM | SYSTOLIC BLOOD PRESSURE: 109 MMHG | HEIGHT: 59 IN | HEART RATE: 93 BPM | TEMPERATURE: 97.8 F | BODY MASS INDEX: 42.38 KG/M2

## 2025-02-05 DIAGNOSIS — D68.59: Primary | ICD-10-CM

## 2025-02-05 DIAGNOSIS — D68.59: ICD-10-CM

## 2025-02-05 LAB
APTT PPP: 31.5 SECONDS (ref 24.2–34.2)
AT III PPP CHRO-ACNC: 109 % (ref 94–138)
INR PPP: 0.92 (ref 0.86–1.15)
PROTHROMBIN TIME: 12.5 SECONDS (ref 11.8–14.9)

## 2025-02-05 PROCEDURE — 85306 CLOT INHIBIT PROT S FREE: CPT

## 2025-02-05 PROCEDURE — 85732 THROMBOPLASTIN TIME PARTIAL: CPT

## 2025-02-05 PROCEDURE — 85705 THROMBOPLASTIN INHIBITION: CPT

## 2025-02-05 PROCEDURE — 85300 ANTITHROMBIN III ACTIVITY: CPT

## 2025-02-05 PROCEDURE — 85730 THROMBOPLASTIN TIME PARTIAL: CPT

## 2025-02-05 PROCEDURE — 36415 COLL VENOUS BLD VENIPUNCTURE: CPT

## 2025-02-05 PROCEDURE — 85670 THROMBIN TIME PLASMA: CPT

## 2025-02-05 PROCEDURE — 86146 BETA-2 GLYCOPROTEIN ANTIBODY: CPT

## 2025-02-05 PROCEDURE — G0463 HOSPITAL OUTPT CLINIC VISIT: HCPCS | Performed by: INTERNAL MEDICINE

## 2025-02-05 PROCEDURE — 85610 PROTHROMBIN TIME: CPT

## 2025-02-05 PROCEDURE — 85613 RUSSELL VIPER VENOM DILUTED: CPT

## 2025-02-05 NOTE — PROGRESS NOTES
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 daily, pending lab workup  Previous Treatment: None    Freda Curry is a 34 y.o. female who presents to [facility] HEMATOLOGY & ONCOLOGY for thrombophilia evaluation, concern for antithrombin deficiency or antiphospholipid antibody syndrome.     Patient reports a strong history of thrombophilia on both side of her parents.  Her mother had a ovarian thrombosis.  Paternal grandfather had a DVT.  Paternal grandmother and grandfather both had some kind of blood clots.  Sister has had a DVT after a vascular surgery within a week of surgery.  Patient herself has never had a DVT or a PE.  Previously was taking Micronor and has discontinued currently.  She was instructed by her GYN to stop for now.  Her  is considering vasectomy in the near future but has not completed.  Thrombophilia evaluation was initiated by her GYN office. Patient reports she has additional lab work scheduled for 3 months from now.     9/20/2024: Anticardiolipin IgG 61  Anticardiolipin IgM low 50  Beta-2 glycoprotein IgG less than 9 normal.  Beta-2 glycoprotein IgM 18 normal.   Factor II DNA analysis normal  Factor V Leiden normal  Antithrombin activity is 87 %  Lupus anticoagulant screen: Lupus anticoagulant was not detected  Protein C functional is 97  Protein S functional is low at 49      History of Present Illness  The patient is here to discuss evaluation for thrombophilia.    She has a history of severe migraines, which had previously subsided following her college graduation but have recently recurred. During her annual checkup with Ob/GYN Dr. Sandoval, she reported these headaches, leading to the discontinuation of her birth control regimen and subsequent blood work. The initial  "blood work was conducted one month after stopping birth control, with a follow-up test 12 weeks later. She has no personal history of thrombosis, and no brain imaging has been performed to investigate potential clots. She has no history of surgeries, trauma, or illness, except for a  performed in 2021. She suspects a possible miscarriage, as she had a positive pregnancy test followed by bleeding, but this was not medically confirmed. Her  is scheduled for a vasectomy, and she is not currently on any medication.    FAMILY HISTORY  She has a significant family history of strokes and blood clots, including both paternal grandparents and her maternal grandfather. Her sister and mother have had DVTs. Her maternal grandfather was on blood thinners for a long time for unclear cause.       Review of Systems     Oncology/Hematology History    No history exists.       Objective     Vitals:    25 1506   BP: 109/78   Pulse: 93   Resp: 18   Temp: 97.8 °F (36.6 °C)   TempSrc: Temporal   SpO2: 100%   Weight: 95.3 kg (210 lb 3.2 oz)   Height: 149.9 cm (59\")   PainSc: 0-No pain     ECOG score: 0         PHQ-9 Total Score:         Physical Exam    Physical Exam        Past Medical History     Past Medical History:   Diagnosis Date    Asthma     Female infertility     H/O:  08/10/2021    History of gestational diabetes     HPV (human papilloma virus) infection     migraine without aura     Pap smear abnormality of cervix/human papillomavirus (HPV) positive     Polycystic ovary syndrome     PONV (postoperative nausea and vomiting)      No current outpatient medications on file prior to visit.     No current facility-administered medications on file prior to visit.      Allergies   Allergen Reactions    Penicillins Rash     Past Surgical History:   Procedure Laterality Date     SECTION  2016     SECTION N/A 2021    Procedure:  SECTION REPEAT;  Surgeon: Lori Sandoval, " "DO;  Location: [facility] LABOR DELIVERY;  Service: Obstetrics/Gynecology;  Laterality: N/A;    CHOLECYSTECTOMY  2002    COLPOSCOPY  05/2022    LSIL, CIN1    LAPAROSCOPIC CHOLECYSTECTOMY  2001    PARATHYROID GLAND SURGERY  2012    Parathyroid tumor removal    WISDOM TOOTH EXTRACTION  2014     Social History     Socioeconomic History    Marital status:      Spouse name: Dirk    Number of children: 2   Tobacco Use    Smoking status: Never    Smokeless tobacco: Never   Vaping Use    Vaping status: Never Used   Substance and Sexual Activity    Alcohol use: Never    Drug use: Never    Sexual activity: Yes     Partners: Male     Birth control/protection: Condom     Family History   Problem Relation Age of Onset    Diabetes Father     Hypertension Father     Deep vein thrombosis Mother         after hyst    Deep vein thrombosis Sister         w hx of vein surgery    Cervical cancer Sister     Skin cancer Paternal Grandmother     Melanoma Paternal Grandmother     Breast cancer Maternal Grandmother     Deep vein thrombosis Maternal Grandfather         on blood thinners and + thrombophilia    Uterine cancer Neg Hx     Prostate cancer Neg Hx     Colon cancer Neg Hx     Pulmonary embolism Neg Hx     Ovarian cancer Neg Hx        Results     Result Review   The following data was reviewed by: Beau Álvarez MD on 02/05/2025:  Lab Results   Component Value Date    HGB 11.6 (L) 08/12/2021    HCT 34.4 08/12/2021    MCV 93.0 08/12/2021     08/12/2021    WBC 12.50 (H) 08/12/2021    NEUTROABS 8.64 (H) 08/12/2021    LYMPHSABS 2.76 08/12/2021    MONOSABS 0.93 (H) 08/12/2021    EOSABS 0.06 08/12/2021    BASOSABS 0.05 08/12/2021     No results found for: \"GLUCOSE\", \"BUN\", \"CREATININE\", \"NA\", \"K\", \"CL\", \"CO2\", \"CALCIUM\", \"PROTEINTOT\", \"ALBUMIN\", \"BILITOT\", \"ALKPHOS\", \"AST\", \"ALT\"  Lab Results   Component Value Date    FREET4 1.18 01/03/2025    TSH 1.020 09/20/2024       No radiology results for the last day       Assessment & Plan "     Diagnoses and all orders for this visit:    1. Thrombophilia due to acquired protein S deficiency (Primary)  -     Lupus Anticoagulant; Future  -     Beta-2 Glycoprotein Antibodies; Future  -     Antithrombin III; Future  -     Protein S Functional; Future  -     aPTT; Future  -     Protime-INR; Future  -     CBC & Differential; Future  -     Comprehensive Metabolic Panel; Future         Freda Curry is a 34 y.o. female who presents to [facility] HEMATOLOGY & ONCOLOGY for thrombophilia evaluation.      Assessment & Plan  1. Thrombophilia.  Discussed results of labs from 9/20/2024:  Anticardiolipin IgG 61 (positive)  Anticardiolipin IgM 50 (positive)  Beta-2 glycoprotein IgG less than 9 normal.  Beta-2 glycoprotein IgM 18 normal.   Factor II DNA analysis normal  Factor V Leiden normal  Antithrombin activity is 87 %  Lupus anticoagulant screen: Lupus anticoagulant was not detected  Protein C functional is 97  Protein S functional is low at 49    12/20/2024: Anticardiolipin antibodies IgG was 45 (positive), Lupus anticoagulant and Beta 2 glycoprotein antibodies were not repeated.     Based on these findings, she has been diagnosed with antiphospholipid antibody syndrome (APS) due to positive anti-cardiolipin antibodies, which increases her risk for thrombosis and miscarriages.   -A re-evaluation of the lupus anticoagulant and  antibodies will be conducted to assess if pt has any additional positive results.   -She will be initiated on a regimen of baby aspirin. Additional laboratory tests, including beta-2 glycoprotein, lupus anticoagulant, antithrombin, protein S, PT, PTT, and INR, will be ordered today.   -She has been advised to seek immediate medical attention at the emergency room if she experiences unilateral leg swelling, sudden onset of pain, chest discomfort, or shortness of breath. If the lab results indicate a high risk, oral medications like Eliquis or Xarelto may be  considered.    Follow-up  The patient is scheduled for a follow-up visit in 3 weeks to discuss results of labs from today protein S, beta-2 glycoprotein antibody, lupus anticoagulant, Antithrombin levels as well as coagulation test.  Also, to determine if she requires lifelong anticoagulation.    Please note that portions of this note were completed with a voice recognition program.    Electronically signed by Beau Álvarez MD, 02/05/25, 4:53 PM EST.      Follow Up     I spent 45 minutes caring for Freda on this date of service. This time includes time spent by me in the following activities:preparing for the visit, reviewing tests, obtaining and/or reviewing a separately obtained history, performing a medically appropriate examination and/or evaluation , counseling and educating the patient/family/caregiver, ordering medications, tests, or procedures, referring and communicating with other health care professionals , documenting information in the medical record, independently interpreting results and communicating that information with the patient/family/caregiver, and care coordination    Any chemotherapy or immunotherapy or other systemic therapy treatment plan involves a high risk of complications and/or mortality of patient management.    The patient was seen and examined. Work by the provider also included review and/or ordering of lab tests, review and/or ordering of radiology tests, review and/or ordering of medicine tests, discussion with other physicians or providers, independent review of data, obtaining old records, review/summation of old records, and/or other review.    I have reviewed the family history, social history, and past medical history for this patient. Previous information and data has been reviewed and updated as needed. I have reviewed and verified the chief complaint, history, and other documentation. The patient was interviewed and examined in the clinic and the chart reviewed. The  previous observations, recommendations, and conclusions were reviewed including those of other providers.     The plan was discussed with the patient and/or family. The patient was given time to ask questions and these questions were answered. At the conclusion of their visit they had no additional questions or concerns and all questions were answered to their satisfaction.    Patient was given instructions and counseling regarding her condition or for health maintenance advice. Please see specific information pulled into the AVS if appropriate.       Patient or patient representative verbalized consent for the use of Ambient Listening during the visit with  Beau Álvarez MD for chart documentation. 2/5/2025  16:53 EST

## 2025-02-06 LAB
B2 GLYCOPROT1 IGA SER-ACNC: <9 GPI IGA UNITS (ref 0–25)
B2 GLYCOPROT1 IGG SER-ACNC: <9 GPI IGG UNITS (ref 0–20)
B2 GLYCOPROT1 IGM SER-ACNC: 17 GPI IGM UNITS (ref 0–32)

## 2025-02-07 LAB
APTT SCREEN TO CONFIRM RATIO: 1.03 RATIO (ref 0–1.34)
CONFIRM APTT/NORMAL: 36.7 SEC (ref 0–47.6)
LA 2 SCREEN W REFLEX-IMP: NORMAL
PROT S ACT/NOR PPP: 57 % (ref 63–140)
SCREEN APTT: 41.8 SEC (ref 0–43.5)
SCREEN DRVVT: 36.2 SEC (ref 0–47)
THROMBIN TIME: 18.3 SEC (ref 0–23)

## 2025-02-26 ENCOUNTER — LAB (OUTPATIENT)
Dept: LAB | Facility: HOSPITAL | Age: 35
End: 2025-02-26
Payer: COMMERCIAL

## 2025-02-26 DIAGNOSIS — D68.59: ICD-10-CM

## 2025-02-26 LAB
ALBUMIN SERPL-MCNC: 3.9 G/DL (ref 3.5–5.2)
ALBUMIN/GLOB SERPL: 1.4 G/DL
ALP SERPL-CCNC: 81 U/L (ref 39–117)
ALT SERPL W P-5'-P-CCNC: 25 U/L (ref 1–33)
ANION GAP SERPL CALCULATED.3IONS-SCNC: 10 MMOL/L (ref 5–15)
AST SERPL-CCNC: 23 U/L (ref 1–32)
BASOPHILS # BLD AUTO: 0.03 10*3/MM3 (ref 0–0.2)
BASOPHILS NFR BLD AUTO: 0.4 % (ref 0–1.5)
BILIRUB SERPL-MCNC: 0.5 MG/DL (ref 0–1.2)
BUN SERPL-MCNC: 11 MG/DL (ref 6–20)
BUN/CREAT SERPL: 12.5 (ref 7–25)
CALCIUM SPEC-SCNC: 9.3 MG/DL (ref 8.6–10.5)
CHLORIDE SERPL-SCNC: 109 MMOL/L (ref 98–107)
CO2 SERPL-SCNC: 23 MMOL/L (ref 22–29)
CREAT SERPL-MCNC: 0.88 MG/DL (ref 0.57–1)
DEPRECATED RDW RBC AUTO: 40.8 FL (ref 37–54)
EGFRCR SERPLBLD CKD-EPI 2021: 88 ML/MIN/1.73
EOSINOPHIL # BLD AUTO: 0.12 10*3/MM3 (ref 0–0.4)
EOSINOPHIL NFR BLD AUTO: 1.5 % (ref 0.3–6.2)
ERYTHROCYTE [DISTWIDTH] IN BLOOD BY AUTOMATED COUNT: 12.3 % (ref 12.3–15.4)
GLOBULIN UR ELPH-MCNC: 2.7 GM/DL
GLUCOSE SERPL-MCNC: 82 MG/DL (ref 65–99)
HCT VFR BLD AUTO: 41.7 % (ref 34–46.6)
HGB BLD-MCNC: 14.4 G/DL (ref 12–15.9)
IMM GRANULOCYTES # BLD AUTO: 0.01 10*3/MM3 (ref 0–0.05)
IMM GRANULOCYTES NFR BLD AUTO: 0.1 % (ref 0–0.5)
LYMPHOCYTES # BLD AUTO: 3.02 10*3/MM3 (ref 0.7–3.1)
LYMPHOCYTES NFR BLD AUTO: 38.9 % (ref 19.6–45.3)
MCH RBC QN AUTO: 31.9 PG (ref 26.6–33)
MCHC RBC AUTO-ENTMCNC: 34.5 G/DL (ref 31.5–35.7)
MCV RBC AUTO: 92.3 FL (ref 79–97)
MONOCYTES # BLD AUTO: 0.43 10*3/MM3 (ref 0.1–0.9)
MONOCYTES NFR BLD AUTO: 5.5 % (ref 5–12)
NEUTROPHILS NFR BLD AUTO: 4.15 10*3/MM3 (ref 1.7–7)
NEUTROPHILS NFR BLD AUTO: 53.6 % (ref 42.7–76)
NRBC BLD AUTO-RTO: 0 /100 WBC (ref 0–0.2)
PLATELET # BLD AUTO: 178 10*3/MM3 (ref 140–450)
PMV BLD AUTO: 12.8 FL (ref 6–12)
POTASSIUM SERPL-SCNC: 4.2 MMOL/L (ref 3.5–5.2)
PROT SERPL-MCNC: 6.6 G/DL (ref 6–8.5)
RBC # BLD AUTO: 4.52 10*6/MM3 (ref 3.77–5.28)
SODIUM SERPL-SCNC: 142 MMOL/L (ref 136–145)
WBC NRBC COR # BLD AUTO: 7.76 10*3/MM3 (ref 3.4–10.8)

## 2025-02-26 PROCEDURE — 85025 COMPLETE CBC W/AUTO DIFF WBC: CPT

## 2025-02-26 PROCEDURE — 36415 COLL VENOUS BLD VENIPUNCTURE: CPT

## 2025-02-26 PROCEDURE — 80053 COMPREHEN METABOLIC PANEL: CPT

## 2025-02-28 ENCOUNTER — OFFICE VISIT (OUTPATIENT)
Dept: ONCOLOGY | Facility: HOSPITAL | Age: 35
End: 2025-02-28
Payer: COMMERCIAL

## 2025-02-28 VITALS
TEMPERATURE: 97.4 F | BODY MASS INDEX: 42.58 KG/M2 | RESPIRATION RATE: 18 BRPM | HEIGHT: 59 IN | SYSTOLIC BLOOD PRESSURE: 137 MMHG | DIASTOLIC BLOOD PRESSURE: 81 MMHG | WEIGHT: 211.2 LBS | OXYGEN SATURATION: 100 % | HEART RATE: 51 BPM

## 2025-02-28 DIAGNOSIS — D68.61 APS (ANTIPHOSPHOLIPID SYNDROME): Primary | ICD-10-CM

## 2025-02-28 DIAGNOSIS — D68.59: ICD-10-CM

## 2025-02-28 RX ORDER — ASPIRIN 81 MG/1
81 TABLET ORAL DAILY
COMMUNITY
End: 2025-02-28

## 2025-02-28 RX ORDER — APIXABAN 5 MG (74)
5 KIT ORAL SEE ADMIN INSTRUCTIONS
Qty: 74 TABLET | Refills: 0 | Status: SHIPPED | OUTPATIENT
Start: 2025-02-28

## 2025-02-28 NOTE — PROGRESS NOTES
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 female who presents to [facility] HEMATOLOGY & ONCOLOGY for thrombophilia evaluation, concern for antithrombin deficiency or antiphospholipid antibody syndrome.     Patient reports a strong history of thrombophilia on both side of her parents.  Her mother had a ovarian thrombosis.  Paternal grandfather had a DVT.  Paternal grandmother and grandfather both had some kind of blood clots.  Sister has had a DVT after a vascular surgery within a week of surgery.  Patient herself has never had a DVT or a PE.  Previously was taking Micronor and has discontinued currently.  She was instructed by her GYN to stop for now.  Her  is considering vasectomy in the near future but has not completed.  Thrombophilia evaluation was initiated by her GYN office. Patient reports she has additional lab work scheduled for 3 months from now.     9/20/2024: Anticardiolipin IgG 61  Anticardiolipin IgM low 50  Beta-2 glycoprotein IgG less than 9 normal.  Beta-2 glycoprotein IgM 18 normal.   Factor II DNA analysis normal  Factor V Leiden normal  Antithrombin activity is 87 %  Lupus anticoagulant screen: Lupus anticoagulant was not detected  Protein C functional is 97  Protein S functional is low at 49      History of Present Illness  The patient is here to discuss evaluation for thrombophilia.    She has a history of severe migraines, which had previously subsided following her college graduation but have recently recurred. During her annual checkup with Ob/GYN Dr. Sandoval, she reported these headaches, leading to the discontinuation of her birth control regimen and  "subsequent blood work. The initial blood work was conducted one month after stopping birth control, with a follow-up test 12 weeks later. She has no personal history of thrombosis, and no brain imaging has been performed to investigate potential clots. She has no history of surgeries, trauma, or illness, except for a  performed in 2021. She suspects a possible miscarriage, as she had a positive pregnancy test followed by bleeding, but this was not medically confirmed. Her  is scheduled for a vasectomy, and she is not currently on any medication.    FAMILY HISTORY  She has a significant family history of strokes and blood clots, including both paternal grandparents and her maternal grandfather. Her sister and mother have had DVTs. Her maternal grandfather was on blood thinners for a long time for unclear cause.       Interval history: Patient to follow-up regarding labs obtained to assess for thrombophilia.  No report of blood clots.  Patient reports her  is undergoing a vasectomy in the coming days, that she does not plan to go back on birth control medications.  Patient also reports plans to begin a over-the-counter supplement that contains rivas-inositol, omega-3, magnesium, zinc and vitamin D3 to assist PCOS, she also plans to discuss getting supplement with her OB/GYN Dr. Kemp as well prior to beginning it    Review of Systems     Oncology/Hematology History    No history exists.       Objective     Vitals:    25 0847   BP: 137/81   Pulse: 51   Resp: 18   Temp: 97.4 °F (36.3 °C)   TempSrc: Temporal   SpO2: 100%   Weight: 95.8 kg (211 lb 3.2 oz)   Height: 149.9 cm (59\")   PainSc: 0-No pain       ECOG score: 0         PHQ-9 Total Score:         Physical Exam  Vitals reviewed. Exam conducted with a chaperone present.   Constitutional:       General: She is not in acute distress.     Appearance: Normal appearance.   HENT:      Head: Normocephalic and atraumatic.   Eyes:      " Extraocular Movements: Extraocular movements intact.      Conjunctiva/sclera: Conjunctivae normal.   Pulmonary:      Effort: Pulmonary effort is normal.   Musculoskeletal:      Cervical back: Normal range of motion and neck supple.   Skin:     General: Skin is warm and dry.      Findings: No bruising.   Neurological:      Mental Status: She is oriented to person, place, and time.       Physical Exam        Past Medical History     Past Medical History:   Diagnosis Date    Asthma     Female infertility     H/O:  08/10/2021    History of gestational diabetes     HPV (human papilloma virus) infection     migraine without aura     Pap smear abnormality of cervix/human papillomavirus (HPV) positive     Polycystic ovary syndrome     PONV (postoperative nausea and vomiting)      Current Outpatient Medications on File Prior to Visit   Medication Sig Dispense Refill    aspirin 81 MG EC tablet Take 1 tablet by mouth Daily.       No current facility-administered medications on file prior to visit.      Allergies   Allergen Reactions    Penicillins Rash     Past Surgical History:   Procedure Laterality Date     SECTION       SECTION N/A 2021    Procedure:  SECTION REPEAT;  Surgeon: Lori Sandoval DO;  Location: [facility] LABOR DELIVERY;  Service: Obstetrics/Gynecology;  Laterality: N/A;    CHOLECYSTECTOMY  2002    COLPOSCOPY  2022    LSIL, CIN1    LAPAROSCOPIC CHOLECYSTECTOMY      PARATHYROID GLAND SURGERY      Parathyroid tumor removal    WISDOM TOOTH EXTRACTION       Social History     Socioeconomic History    Marital status:      Spouse name: Dirk    Number of children: 2   Tobacco Use    Smoking status: Never    Smokeless tobacco: Never   Vaping Use    Vaping status: Never Used   Substance and Sexual Activity    Alcohol use: Never    Drug use: Never    Sexual activity: Yes     Partners: Male     Birth control/protection: Condom     Family History   Problem  Relation Age of Onset    Diabetes Father     Hypertension Father     Deep vein thrombosis Mother         after hyst    Deep vein thrombosis Sister         w hx of vein surgery    Cervical cancer Sister     Skin cancer Paternal Grandmother     Melanoma Paternal Grandmother     Breast cancer Maternal Grandmother     Deep vein thrombosis Maternal Grandfather         on blood thinners and + thrombophilia    Uterine cancer Neg Hx     Prostate cancer Neg Hx     Colon cancer Neg Hx     Pulmonary embolism Neg Hx     Ovarian cancer Neg Hx        Results     Result Review   The following data was reviewed by: Beau Álvarez MD on 02/05/2025:  Lab Results   Component Value Date    HGB 14.4 02/26/2025    HCT 41.7 02/26/2025    MCV 92.3 02/26/2025     02/26/2025    WBC 7.76 02/26/2025    NEUTROABS 4.15 02/26/2025    LYMPHSABS 3.02 02/26/2025    MONOSABS 0.43 02/26/2025    EOSABS 0.12 02/26/2025    BASOSABS 0.03 02/26/2025     Lab Results   Component Value Date    GLUCOSE 82 02/26/2025    BUN 11 02/26/2025    CREATININE 0.88 02/26/2025     02/26/2025    K 4.2 02/26/2025     (H) 02/26/2025    CO2 23.0 02/26/2025    CALCIUM 9.3 02/26/2025    PROTEINTOT 6.6 02/26/2025    ALBUMIN 3.9 02/26/2025    BILITOT 0.5 02/26/2025    ALKPHOS 81 02/26/2025    AST 23 02/26/2025    ALT 25 02/26/2025     Lab Results   Component Value Date    FREET4 1.18 01/03/2025    TSH 1.020 09/20/2024       No radiology results for the last day       Assessment & Plan     There are no diagnoses linked to this encounter.       Freda Curry is a 34 y.o. female who presents to [facility] HEMATOLOGY & ONCOLOGY for thrombophilia evaluation.      Assessment & Plan  1. Thrombophilia.  Discussed results of labs from 9/20/2024:  Anticardiolipin IgG 61 (positive)  Anticardiolipin IgM 50 (positive)  Beta-2 glycoprotein IgG less than 9 normal.  Beta-2 glycoprotein IgM 18 normal.   Factor II DNA analysis normal  Factor V Leiden  normal  Antithrombin activity is 87 %  Lupus anticoagulant screen: Lupus anticoagulant was not detected  Protein C functional is 97  Protein S functional is low at 49    12/20/2024: Anticardiolipin antibodies IgG was 45 (positive), Lupus anticoagulant and Beta 2 glycoprotein antibodies were not repeated.     Based on these findings, she has been diagnosed with antiphospholipid antibody syndrome (APS) due to positive anti-cardiolipin antibodies, which increases her risk for thrombosis and miscarriages.   -A re-evaluation of the lupus anticoagulant and  antibodies will be conducted to assess if pt has any additional positive results.   -Discussed results of lupus anticoagulant and beta-2 glycoprotein antibodies which were both negative on 2/5/2025, this patient has only positivity in the cardiolipin antibodies.  - Patient also underwent retesting for protein S deficiency which was decreased on 2/5/2025, indicating deficiency, of note Antithrombin testing on 2/5/2025 was negative.  --Discussed based on lab work obtained thus far patient has evidence of a protein S deficiency as well as antiphospholipid antibody syndrome, thus initiated patient on oral DOAC medication Eliquis, recommend lifelong anticoagulation  -Patient reports her  is scheduled for vasectomy next week that she does not plan to restart on birth control, however did caution patient given her thrombophilia diagnosis importance of prophylactic and/or perioperative anticoagulation for any upcoming surgeries, also counseled patient notify me if she is diagnosed with a blood clot in between clinic appointments with me  -She has been advised to seek immediate medical attention at the emergency room if she experiences unilateral leg swelling, sudden onset of pain, chest discomfort, or shortness of breath.     Follow-up  The patient is scheduled for a follow-up visit in 6 months with CBC, CMP, Iron profile, ferritin.    Please note that portions of this  note were completed with a voice recognition program.    Electronically signed by Beau Álvarez MD, 02/28/25, 11:55 AM EST.        Follow Up     I spent 30 minutes caring for Freda on this date of service. This time includes time spent by me in the following activities:preparing for the visit, reviewing tests, obtaining and/or reviewing a separately obtained history, performing a medically appropriate examination and/or evaluation , counseling and educating the patient/family/caregiver, ordering medications, tests, or procedures, referring and communicating with other health care professionals , documenting information in the medical record, independently interpreting results and communicating that information with the patient/family/caregiver, and care coordination    Any chemotherapy or immunotherapy or other systemic therapy treatment plan involves a high risk of complications and/or mortality of patient management.    The patient was seen and examined. Work by the provider also included review and/or ordering of lab tests, review and/or ordering of radiology tests, review and/or ordering of medicine tests, discussion with other physicians or providers, independent review of data, obtaining old records, review/summation of old records, and/or other review.    I have reviewed the family history, social history, and past medical history for this patient. Previous information and data has been reviewed and updated as needed. I have reviewed and verified the chief complaint, history, and other documentation. The patient was interviewed and examined in the clinic and the chart reviewed. The previous observations, recommendations, and conclusions were reviewed including those of other providers.     The plan was discussed with the patient and/or family. The patient was given time to ask questions and these questions were answered. At the conclusion of their visit they had no additional questions or concerns and all  questions were answered to their satisfaction.    Patient was given instructions and counseling regarding her condition or for health maintenance advice. Please see specific information pulled into the AVS if appropriate.       Patient or patient representative verbalized consent for the use of Ambient Listening during the visit with  Beau Álvarez MD for chart documentation. 2/28/2025  16:53 EST

## 2025-03-03 ENCOUNTER — TELEPHONE (OUTPATIENT)
Dept: ONCOLOGY | Facility: HOSPITAL | Age: 35
End: 2025-03-03
Payer: COMMERCIAL

## 2025-03-03 NOTE — TELEPHONE ENCOUNTER
SPOKE TO PT ABOUT ELIQUMISSY SHAHIDARMIDA AND TOLD HER SINCE SHE HAD COMMERCIAL INSURANCE I COULD GIVE HER A CO PAY CARD FOR 30 DAY FREE TRIAL. ALSO RECOMMENDED [facility]. SHE IS ALSO GOING TO CHECK THROUGH [employer] TO SEE IF IT'S CHEAPER. I OFFERED TO APPLY HER FOR ASSISTANCE PROGRAM THROUGH [email or web address], BUT THAT IT'S INCOME BASED AND SHE SAID SHE WOULDN'T QUALIFY FOR THAT. ALL QUESTIONS WERE ANSWERED AND I TOLD HER IF I COULD HELP AT ALL TO LET ME KNOW.

## 2025-04-07 ENCOUNTER — TELEPHONE (OUTPATIENT)
Dept: ONCOLOGY | Facility: HOSPITAL | Age: 35
End: 2025-04-07
Payer: COMMERCIAL

## 2025-04-07 NOTE — TELEPHONE ENCOUNTER
Left voicemail to clarify that pt has completed the Eliquis starter pack and is ready to start maintenance dose. Waiting for call back.

## (undated) DEVICE — C SECTION PACK: Brand: MEDLINE INDUSTRIES, INC.

## (undated) DEVICE — NEEDLE,18GX1.5",REG,BEVEL: Brand: MEDLINE

## (undated) DEVICE — STERILE POLYISOPRENE POWDER-FREE SURGICAL GLOVES WITH EMOLLIENT COATING: Brand: PROTEXIS

## (undated) DEVICE — CVR HNDL LT SURG ACCSSRY BLU STRL

## (undated) DEVICE — DEV TRANSF BLD W/LUER ADPT CA/198

## (undated) DEVICE — SUT VICRYL 3/0 CT1 27IN J258H

## (undated) DEVICE — PAD GRND REM POLYHESIVE A/ DISP

## (undated) DEVICE — Device: Brand: PORTEX

## (undated) DEVICE — VIOLET BRAIDED (POLYGLACTIN 910), SYNTHETIC ABSORBABLE SUTURE: Brand: COATED VICRYL

## (undated) DEVICE — GLV SURG BIOGEL LTX PF 6 1/2

## (undated) DEVICE — TRY CATH FOL ADVANCE SIL W/BAG 16F

## (undated) DEVICE — SUT CHRM 0 CT1 36IN 924H

## (undated) DEVICE — INTENDED FOR TISSUE SEPARATION, AND OTHER PROCEDURES THAT REQUIRE A SHARP SURGICAL BLADE TO PUNCTURE OR CUT.: Brand: BARD-PARKER ® CARBON RIB-BACK BLADES

## (undated) DEVICE — SUT VIC 0 CTX 36IN J978H

## (undated) DEVICE — PROXIMATE RH ROTATING HEAD SKIN STAPLERS (35 WIDE) CONTAINS 35 STAINLESS STEEL STAPLES: Brand: PROXIMATE

## (undated) DEVICE — SUT MNCRYL 3/0 CT1 36 IN Y944H